# Patient Record
Sex: FEMALE | Race: WHITE | NOT HISPANIC OR LATINO | URBAN - METROPOLITAN AREA
[De-identification: names, ages, dates, MRNs, and addresses within clinical notes are randomized per-mention and may not be internally consistent; named-entity substitution may affect disease eponyms.]

---

## 2017-04-24 ENCOUNTER — OUTPATIENT (OUTPATIENT)
Dept: OUTPATIENT SERVICES | Facility: HOSPITAL | Age: 47
LOS: 1 days | Discharge: HOME | End: 2017-04-24

## 2017-06-27 DIAGNOSIS — J18.9 PNEUMONIA, UNSPECIFIED ORGANISM: ICD-10-CM

## 2017-07-25 ENCOUNTER — EMERGENCY (EMERGENCY)
Facility: HOSPITAL | Age: 47
LOS: 0 days | Discharge: HOME | End: 2017-07-25
Admitting: INTERNAL MEDICINE

## 2017-07-25 DIAGNOSIS — E03.9 HYPOTHYROIDISM, UNSPECIFIED: ICD-10-CM

## 2017-07-25 DIAGNOSIS — S99.922A UNSPECIFIED INJURY OF LEFT FOOT, INITIAL ENCOUNTER: ICD-10-CM

## 2017-07-25 DIAGNOSIS — S90.32XA CONTUSION OF LEFT FOOT, INITIAL ENCOUNTER: ICD-10-CM
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
## 2017-07-25 DIAGNOSIS — J45.909 UNSPECIFIED ASTHMA, UNCOMPLICATED: ICD-10-CM

## 2017-07-25 DIAGNOSIS — I10 ESSENTIAL (PRIMARY) HYPERTENSION: ICD-10-CM

## 2017-07-25 DIAGNOSIS — Z88.2 ALLERGY STATUS TO SULFONAMIDES: ICD-10-CM
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
## 2017-12-04 ENCOUNTER — TRANSCRIPTION ENCOUNTER (OUTPATIENT)
Age: 47
End: 2017-12-04

## 2018-01-23 ENCOUNTER — TRANSCRIPTION ENCOUNTER (OUTPATIENT)
Age: 48
End: 2018-01-23

## 2018-02-10 ENCOUNTER — TRANSCRIPTION ENCOUNTER (OUTPATIENT)
Age: 48
End: 2018-02-10

## 2019-03-06 ENCOUNTER — EMERGENCY (EMERGENCY)
Facility: HOSPITAL | Age: 49
LOS: 0 days | Discharge: HOME | End: 2019-03-07
Attending: EMERGENCY MEDICINE | Admitting: EMERGENCY MEDICINE

## 2019-03-06 VITALS — WEIGHT: 220.02 LBS

## 2019-03-06 DIAGNOSIS — T78.09XA ANAPHYLACTIC REACTION DUE TO OTHER FOOD PRODUCTS, INITIAL ENCOUNTER: ICD-10-CM
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
## 2019-03-06 DIAGNOSIS — Z88.2 ALLERGY STATUS TO SULFONAMIDES: ICD-10-CM

## 2019-03-06 DIAGNOSIS — Z91.011 ALLERGY TO MILK PRODUCTS: ICD-10-CM
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
## 2019-03-06 DIAGNOSIS — Z91.013 ALLERGY TO SEAFOOD: ICD-10-CM

## 2019-03-06 DIAGNOSIS — T78.40XA ALLERGY, UNSPECIFIED, INITIAL ENCOUNTER: ICD-10-CM

## 2019-03-06 RX ORDER — EPINEPHRINE 0.3 MG/.3ML
0.3 INJECTION INTRAMUSCULAR; SUBCUTANEOUS ONCE
Qty: 0 | Refills: 0 | Status: COMPLETED | OUTPATIENT
Start: 2019-03-06 | End: 2019-03-06

## 2019-03-06 RX ORDER — FAMOTIDINE 10 MG/ML
20 INJECTION INTRAVENOUS ONCE
Qty: 0 | Refills: 0 | Status: COMPLETED | OUTPATIENT
Start: 2019-03-06 | End: 2019-03-06

## 2019-03-06 RX ORDER — SODIUM CHLORIDE 9 MG/ML
1000 INJECTION INTRAMUSCULAR; INTRAVENOUS; SUBCUTANEOUS ONCE
Qty: 0 | Refills: 0 | Status: COMPLETED | OUTPATIENT
Start: 2019-03-06 | End: 2019-03-06

## 2019-03-06 RX ORDER — DIPHENHYDRAMINE HCL 50 MG
50 CAPSULE ORAL ONCE
Qty: 0 | Refills: 0 | Status: COMPLETED | OUTPATIENT
Start: 2019-03-06 | End: 2019-03-06

## 2019-03-06 RX ADMIN — EPINEPHRINE 0.3 MILLIGRAM(S): 0.3 INJECTION INTRAMUSCULAR; SUBCUTANEOUS at 23:57

## 2019-03-06 RX ADMIN — FAMOTIDINE 20 MILLIGRAM(S): 10 INJECTION INTRAVENOUS at 22:48

## 2019-03-06 RX ADMIN — SODIUM CHLORIDE 1000 MILLILITER(S): 9 INJECTION INTRAMUSCULAR; INTRAVENOUS; SUBCUTANEOUS at 23:58

## 2019-03-06 RX ADMIN — Medication 50 MILLIGRAM(S): at 23:57

## 2019-03-06 NOTE — ED ADULT NURSE NOTE - CHPI ED NUR SYMPTOMS NEG
no swelling of face, tongue/no rash/no vomiting/no congestion/no shortness of breath/no throat itching/no wheezing/no nausea

## 2019-03-06 NOTE — ED ADULT NURSE NOTE - OBJECTIVE STATEMENT
Pt c/o "lump in the throat" no SOB, has difficulty swallowing. Went to urgent care earlier, got benadryl, solumedrol. Pt states has multiple food allergies.

## 2019-03-06 NOTE — ED ADULT TRIAGE NOTE - CHIEF COMPLAINT QUOTE
Pt came c/o throat tightness and difficulty breathing after she ate a muffin at 1pm today. Pt went to urgent care and receive Dexamethasone and Solumedrol injection, also took Benadryl at home with no relief.

## 2019-03-06 NOTE — ED ADULT NURSE NOTE - CADM POA URETHRAL CATHETER
Received faxed request from [place] for duloxetine refill. Talked to pharmacy staff member who confirms that patient still has refills available. Patient requested refill in error. No

## 2019-03-07 VITALS — HEART RATE: 102 BPM

## 2019-03-07 RX ORDER — EPINEPHRINE 0.3 MG/.3ML
0.3 INJECTION INTRAMUSCULAR; SUBCUTANEOUS
Qty: 0.3 | Refills: 0 | OUTPATIENT
Start: 2019-03-07 | End: 2019-03-07

## 2019-03-07 RX ORDER — ALBUTEROL 90 UG/1
2 AEROSOL, METERED ORAL
Qty: 1 | Refills: 0 | OUTPATIENT
Start: 2019-03-07 | End: 2019-04-05

## 2019-03-07 RX ADMIN — SODIUM CHLORIDE 1000 MILLILITER(S): 9 INJECTION INTRAMUSCULAR; INTRAVENOUS; SUBCUTANEOUS at 01:39

## 2019-03-07 NOTE — ED PROVIDER NOTE - NS ED ROS FT
Review of Systems  Constitutional:  No fever, chills.  Eyes:  No visual changes, eye pain, or discharge.  ENMT:  No hearing changes, pain, or discharge. No nasal congestion, discharge, or bleeding. No throat pain, swelling, or difficulty swallowing. (+) throat tightness  Cardiac:  No chest pain.  Respiratory:  No orthopnea, stridor, wheezing, or cough. No hemoptysis. (+) SOB  GI:  No diarrhea, or abdominal pain. (+) nausea, vomiting  Skin:  No skin rash, pruritis, jaundice, or lesions.  Neuro:  No headache, dizziness, loss of sensation, or focal weakness.  No change in mental status.   Endocrine: No history of thyroid disease or diabetes.  Heme/Lymph: No jaundice, easy bruising, or bleeding tendency. No history of anemia

## 2019-03-07 NOTE — ED PROVIDER NOTE - PROGRESS NOTE DETAILS
Pt feels much better.  Back to baseline.  Well appearing. Pt reports improvement in symptoms, currently asymptomatic. Discussed results and provided copy to pt. Pt understands to follow-up with PMD and her allergist. Pt understands to return to ED if symptoms return/worsen. Agreeable to discharge.

## 2019-03-07 NOTE — ED PROVIDER NOTE - ATTENDING CONTRIBUTION TO CARE
50 y/o F with h/o multiple food allergies is here with a feeling of tightness in her throat that started 4 hours after eating a muffin out at a restaurant.  Pt ate the muffin at 1pm and then around 5pm started having n/v x 1 and tightness in her throat.  took 50mg of benedryl by mouth at home and no response, so came for eval after going to an [facility] and receiving 10mg decadron and 60mg solumedrol.  Pt states that usually benedryl will help.  Pt does have an epi-pen, but has never used it and was afraid to use it.  No resp distress.  Pt also c/o 2 weeks of cough.  no fever. no rhinorrhea.  PMH GERD, anaphylaxis from foods when she was a child.  EXAM: well appearing. NAD. s1s2, reg. CTAB. abd soft, nd, nt.  Normal posterior pharynx.  Speaking full sentences, but does clear her throat occasionally. P: benedryl, pepcid, epi.  observe.

## 2019-03-07 NOTE — ED PROVIDER NOTE - CLINICAL SUMMARY MEDICAL DECISION MAKING FREE TEXT BOX
Pt with apparent allergic reaction to food that she ate.  h/o food allergies.  Sxs improved and Pt back to baseline.  f/u with PCP or allergist.  Slightly tachycardic on d/c, but Pt is pacing and is very anxious to go home. Pt with apparent allergic reaction to food that she ate.  h/o food allergies.  Pt given epi for anaphylactic type sxs.  Sxs improved and Pt back to baseline.  f/u with PCP or allergist.  Slightly tachycardic on d/c, but Pt is pacing and is very anxious to go home.

## 2019-03-07 NOTE — ED PROVIDER NOTE - PHYSICAL EXAMINATION
VITAL SIGNS: I have reviewed nursing notes and confirm.  CONSTITUTIONAL: Well-developed; well-nourished; in no acute distress.  SKIN: Skin exam is warm and dry, no acute rash.  HEAD: Normocephalic; atraumatic.  EYES: Conjunctiva and sclera clear.  ENT: No nasal discharge; airway clear. Oropharynx clear, uvula midline. No tongue swelling.   NECK: Supple; non tender.  CARD: S1, S2 normal; no murmurs, gallops, or rubs. Regular rate and rhythm.  RESP: No wheezes, rales or rhonchi. Speaking in full sentences.   EXT: Normal ROM. No clubbing, cyanosis or edema.  NEURO: Alert, oriented. Grossly unremarkable. No focal deficits.

## 2019-03-07 NOTE — ED PROVIDER NOTE - OBJECTIVE STATEMENT
48 yo F with PMHx of multiple food allergies presents to the ED c/o throat tightness and SOB. Pt ate muffin earlier today and about 4 hours after symptoms developed. Pt initially felt nausea and had one episode of non-bilious/non-bloody vomiting and then developed throat tightness and trouble breathing. Pt took 50 mg of benadryl and went to [facility] where she received 10 mg of decadron and 60 mg of solumedrol. She was sent to ED for further eval. Here pt still complains of throat tightness and feeling mild SOB. Pt also complains of non-productive cough x 2 weeks. Denies other complaints. Pt denies fever, chills, abdominal pain, diarrhea, headache, dizziness, weakness, chest pain, back pain, LOC, trauma, calf pain/swelling, recent travel, recent surgery.

## 2019-03-07 NOTE — ED PROVIDER NOTE - NSFOLLOWUPINSTRUCTIONS_ED_ALL_ED_FT
Anaphylaxis    An anaphylactic reaction (anaphylaxis) is a sudden, severe allergic reaction that affects multiple areas of the body. An allergic reaction is an abnormal reaction to a substance (allergen) by the body's defense system. Common allergens include medicines, food, insect bites or stings, and blood products. The body releases certain proteins into the blood that can cause a variety of symptoms such as an itchy rash, wheezing, swelling of the face/lips/tongue/throat, abdominal pain, nausea or vomiting. An allergic reaction is usually treated with medication. If your health care provider prescribed you an epinephrine injection device, make sure to keep it with you at all times.    SEEK IMMEDIATE MEDICAL CARE IF YOU HAVE ANY OF THE FOLLOWING SYMPTOMS: allergic reaction severe enough that required you to use epinephrine, tightness in your chest, swelling around your lips/tongue/throat, abdominal pain, vomiting or diarrhea, or lightheadedness/dizziness. These symptoms may represent a serious problem that is an emergency. Do not wait to see if the symptoms will go away. Use your auto-injector pen or anaphylaxis kit as you have been instructed. Call 911 and do not drive yourself to the hospital.

## 2019-03-07 NOTE — ED PROVIDER NOTE - CARE PROVIDER_API CALL
Sharonda Ellsworth)  Allergy and Immunology; Internal Medicine  [address] [place] [address]  Phone: [phone]  Fax: [phone]  Follow Up Time: 1-3 Days

## 2019-08-18 ENCOUNTER — EMERGENCY (EMERGENCY)
Facility: HOSPITAL | Age: 49
LOS: 0 days | Discharge: HOME | End: 2019-08-19
Admitting: EMERGENCY MEDICINE
Payer: MEDICAID

## 2019-08-18 VITALS
HEART RATE: 79 BPM | DIASTOLIC BLOOD PRESSURE: 75 MMHG | RESPIRATION RATE: 20 BRPM | SYSTOLIC BLOOD PRESSURE: 157 MMHG | OXYGEN SATURATION: 98 %

## 2019-08-18 DIAGNOSIS — L60.8 OTHER NAIL DISORDERS: ICD-10-CM

## 2019-08-18 DIAGNOSIS — Z88.2 ALLERGY STATUS TO SULFONAMIDES: ICD-10-CM

## 2019-08-18 DIAGNOSIS — B35.1 TINEA UNGUIUM: ICD-10-CM

## 2019-08-18 DIAGNOSIS — Z91.011 ALLERGY TO MILK PRODUCTS: ICD-10-CM

## 2019-08-18 DIAGNOSIS — Z91.013 ALLERGY TO SEAFOOD: ICD-10-CM

## 2019-08-18 PROCEDURE — 99282 EMERGENCY DEPT VISIT SF MDM: CPT

## 2019-08-19 NOTE — ED PROVIDER NOTE - CARE PROVIDER_API CALL
Chandrakant Pandey)  Dermatology; Internal Medicine  [address] [place] [address]  Phone: [phone]  Fax: [phone]  Follow Up Time:     Ziggy Salmeron)  Dermatology; Internal Medicine  [address] [place] [address]  Phone: [phone]  Fax: [phone]  Follow Up Time:

## 2019-08-19 NOTE — ED PROVIDER NOTE - PMH
Colitis    GERD (gastroesophageal reflux disease)    Hypothyroid    No pertinent past medical history

## 2019-08-19 NOTE — ED PROVIDER NOTE - CLINICAL SUMMARY MEDICAL DECISION MAKING FREE TEXT BOX
Patient came to the ED c/o nail shape abnormality.  Patient states she noticed the nail appears thicker then rest of nails after having powered artifical nails placed and is unsure if fungal infection beneath nail. Nail appears to be slightly thicker then rest, but no TTP. No erythema. No abscess or purulent drainage. No signs of infection.  No fever.  No signs of fungal infection. Patient advised to soak artifical nail off and let breathe.  will give derm follow up.  Return precautions given. Patient verbalizes understanding of plan.  Agreeable to discharge. Patient came to the ED c/o nail shape abnormality.  Patient states she noticed the nail appears thicker then rest of nails after having powered artifical nails placed and is unsure if fungal infection beneath nail. Nail appears to be slightly thicker then rest, but no TTP. No erythema. No abscess or purulent drainage. No signs of infection.  No fever.  Possible onychomycosis. Patient advised to soak artifical nail off and let breathe.  will give derm follow up.  Return precautions given. Patient verbalizes understanding of plan.  Agreeable to discharge.

## 2019-08-19 NOTE — ED PROVIDER NOTE - OBJECTIVE STATEMENT
50 yo female 48 yo female with PMH of GERD, hypothyroidism, colitis presents to the ED c/o nail shape abnormality and requesting evaluation for second opinion.  Patient states she noticed the nail appears thicker then rest of nails after having powered artifical nails placed and is unsure if fungal infection beneath nail. Nail appears to be slightly thicker then rest. Patient noticed this three weeks ago and had nails done again today.  Denies nail/finger pain, fever, chills, rash, pruritus, purulent drainage, numbness/tingling/weakness to extremities B/L, or traumatic event/injury.

## 2019-08-19 NOTE — ED PROVIDER NOTE - CARE PLAN
Principal Discharge DX:	Abnormality of shape of nail Principal Discharge DX:	Onychomycosis  Secondary Diagnosis:	Abnormality of shape of nail

## 2019-08-19 NOTE — ED PROVIDER NOTE - PHYSICAL EXAMINATION
GENERAL: Well-nourished, Well-developed. NAD.  CVS: Normal S1,S2. No murmurs appreciated on auscultation   RESP: No use of accessory muscles. Chest rise symmetrical with good expansion. Lungs clear to auscultation B/L. No wheezing, rales, or rhonchi auscultated.  MSK: No visible signs of trauma such as ecchymosis, erythema, or swelling. FROM of upper and lower extremities B/L.   Skin: + slight nail thickening to right 2nd digit. No signs of infection. No erythema or swelling. No TTP. No ecchymosis. No purulent drainage or fluctuant masses. No paronychia. + nail polish still in place.    EXT: Radial pulses present B/L.   Neuro: AA&O x 3. CNs II-XII grossly intact. Speaking in full sentences. No slurring of speech. No facial droop. No tremors. Sensation grossly intact. Strength 5/5 B/L. Gait within normal limits.   Psych:  Cooperative. Calm

## 2019-08-19 NOTE — ED PROVIDER NOTE - NS ED ROS FT
Constitutional: (-) fever (-) chills   Cardiac: (-) chest pain  Respiratory:  (-) SOB  MS: (-) finger pain   Neuro: (-) numbness/tingling to extremities B/L   Skin: (+) nail thickening (-) rash  Except as documented in the HPI, all other systems are negative.

## 2019-09-01 ENCOUNTER — OUTPATIENT (OUTPATIENT)
Dept: OUTPATIENT SERVICES | Facility: HOSPITAL | Age: 49
LOS: 1 days | End: 2019-09-01
Payer: MEDICAID

## 2019-09-01 PROCEDURE — G9001: CPT

## 2019-09-13 DIAGNOSIS — Z71.89 OTHER SPECIFIED COUNSELING: ICD-10-CM

## 2019-09-13 PROBLEM — E03.9 HYPOTHYROIDISM, UNSPECIFIED: Chronic | Status: ACTIVE | Noted: 2019-08-27

## 2019-09-13 PROBLEM — K21.9 GASTRO-ESOPHAGEAL REFLUX DISEASE WITHOUT ESOPHAGITIS: Chronic | Status: ACTIVE | Noted: 2019-08-27

## 2019-09-13 PROBLEM — K52.9 NONINFECTIVE GASTROENTERITIS AND COLITIS, UNSPECIFIED: Chronic | Status: ACTIVE | Noted: 2019-08-27

## 2019-12-23 NOTE — ED ADULT NURSE NOTE - GASTROINTESTINAL WDL
Occupational Therapy    Occupational Therapy Not Seen Note    DATE: 2019  Name: Henok Peres  : 1953  MRN: [id number]    Patient not available for Occupational Therapy due to:    Pt independent with functional mobility and functional tasks. Pt with no OT acute care needs at this time, will defer OT eval. Pt at baseline.     Next Scheduled Treatment: N/A    Electronically signed by Ariadne Jaffe OT on 2019 at 2:18 PM Abdomen soft, nontender, nondistended, bowel sounds present in all 4 quadrants.

## 2020-06-02 ENCOUNTER — OUTPATIENT (OUTPATIENT)
Dept: OUTPATIENT SERVICES | Facility: HOSPITAL | Age: 50
LOS: 1 days | Discharge: HOME | End: 2020-06-02

## 2020-06-04 ENCOUNTER — OUTPATIENT (OUTPATIENT)
Dept: OUTPATIENT SERVICES | Facility: HOSPITAL | Age: 50
LOS: 1 days | Discharge: HOME | End: 2020-06-04

## 2020-10-02 ENCOUNTER — TRANSCRIPTION ENCOUNTER (OUTPATIENT)
Age: 50
End: 2020-10-02

## 2020-11-29 ENCOUNTER — EMERGENCY (EMERGENCY)
Facility: HOSPITAL | Age: 50
LOS: 1 days | Discharge: HOME | End: 2020-11-29
Admitting: EMERGENCY MEDICINE
Payer: MEDICAID

## 2020-11-29 VITALS
SYSTOLIC BLOOD PRESSURE: 139 MMHG | OXYGEN SATURATION: 97 % | HEART RATE: 90 BPM | DIASTOLIC BLOOD PRESSURE: 65 MMHG | RESPIRATION RATE: 18 BRPM

## 2020-11-29 VITALS
HEART RATE: 94 BPM | WEIGHT: 220.02 LBS | RESPIRATION RATE: 16 BRPM | DIASTOLIC BLOOD PRESSURE: 81 MMHG | OXYGEN SATURATION: 95 % | SYSTOLIC BLOOD PRESSURE: 151 MMHG | TEMPERATURE: 98 F | HEIGHT: 61 IN

## 2020-11-29 DIAGNOSIS — R42 DIZZINESS AND GIDDINESS: ICD-10-CM

## 2020-11-29 DIAGNOSIS — Z91.011 ALLERGY TO MILK PRODUCTS: ICD-10-CM

## 2020-11-29 DIAGNOSIS — Z88.2 ALLERGY STATUS TO SULFONAMIDES: ICD-10-CM

## 2020-11-29 LAB
ALBUMIN SERPL ELPH-MCNC: 3.8 G/DL — SIGNIFICANT CHANGE UP (ref 3.4–5)
ALP SERPL-CCNC: 75 U/L — SIGNIFICANT CHANGE UP (ref 40–120)
ALT FLD-CCNC: 22 U/L — SIGNIFICANT CHANGE UP (ref 12–42)
ANION GAP SERPL CALC-SCNC: 7 MMOL/L — LOW (ref 9–16)
AST SERPL-CCNC: 19 U/L — SIGNIFICANT CHANGE UP (ref 15–37)
BASOPHILS # BLD AUTO: 0 K/UL — SIGNIFICANT CHANGE UP (ref 0–0.2)
BASOPHILS NFR BLD AUTO: 0 % — SIGNIFICANT CHANGE UP (ref 0–2)
BILIRUB SERPL-MCNC: 0.3 MG/DL — SIGNIFICANT CHANGE UP (ref 0.2–1.2)
BUN SERPL-MCNC: 19 MG/DL — SIGNIFICANT CHANGE UP (ref 7–23)
CALCIUM SERPL-MCNC: 9.7 MG/DL — SIGNIFICANT CHANGE UP (ref 8.5–10.5)
CHLORIDE SERPL-SCNC: 104 MMOL/L — SIGNIFICANT CHANGE UP (ref 96–108)
CO2 SERPL-SCNC: 31 MMOL/L — SIGNIFICANT CHANGE UP (ref 22–31)
CREAT SERPL-MCNC: 0.94 MG/DL — SIGNIFICANT CHANGE UP (ref 0.5–1.3)
EOSINOPHIL # BLD AUTO: 0.4 K/UL — SIGNIFICANT CHANGE UP (ref 0–0.5)
EOSINOPHIL NFR BLD AUTO: 3 % — SIGNIFICANT CHANGE UP (ref 0–6)
GLUCOSE SERPL-MCNC: 106 MG/DL — HIGH (ref 70–99)
HCT VFR BLD CALC: 41.3 % — SIGNIFICANT CHANGE UP (ref 34.5–45)
HGB BLD-MCNC: 12.9 G/DL — SIGNIFICANT CHANGE UP (ref 11.5–15.5)
LYMPHOCYTES # BLD AUTO: 19 % — SIGNIFICANT CHANGE UP (ref 13–44)
LYMPHOCYTES # BLD AUTO: 2.51 K/UL — SIGNIFICANT CHANGE UP (ref 1–3.3)
MAGNESIUM SERPL-MCNC: 2 MG/DL — SIGNIFICANT CHANGE UP (ref 1.6–2.6)
MANUAL SMEAR VERIFICATION: SIGNIFICANT CHANGE UP
MCHC RBC-ENTMCNC: 25.9 PG — LOW (ref 27–34)
MCHC RBC-ENTMCNC: 31.2 GM/DL — LOW (ref 32–36)
MCV RBC AUTO: 82.8 FL — SIGNIFICANT CHANGE UP (ref 80–100)
MONOCYTES # BLD AUTO: 0.53 K/UL — SIGNIFICANT CHANGE UP (ref 0–0.9)
MONOCYTES NFR BLD AUTO: 4 % — SIGNIFICANT CHANGE UP (ref 2–14)
NEUTROPHILS # BLD AUTO: 8.71 K/UL — HIGH (ref 1.8–7.4)
NEUTROPHILS NFR BLD AUTO: 66 % — SIGNIFICANT CHANGE UP (ref 43–77)
NRBC # BLD: 0 /100 — SIGNIFICANT CHANGE UP (ref 0–0)
NRBC # BLD: SIGNIFICANT CHANGE UP /100 WBCS (ref 0–0)
PLAT MORPH BLD: NORMAL — SIGNIFICANT CHANGE UP
PLATELET # BLD AUTO: 268 K/UL — SIGNIFICANT CHANGE UP (ref 150–400)
POTASSIUM SERPL-MCNC: 3.7 MMOL/L — SIGNIFICANT CHANGE UP (ref 3.5–5.3)
POTASSIUM SERPL-SCNC: 3.7 MMOL/L — SIGNIFICANT CHANGE UP (ref 3.5–5.3)
PROT SERPL-MCNC: 8.1 G/DL — SIGNIFICANT CHANGE UP (ref 6.4–8.2)
RBC # BLD: 4.99 M/UL — SIGNIFICANT CHANGE UP (ref 3.8–5.2)
RBC # FLD: 13.4 % — SIGNIFICANT CHANGE UP (ref 10.3–14.5)
RBC BLD AUTO: NORMAL — SIGNIFICANT CHANGE UP
SODIUM SERPL-SCNC: 142 MMOL/L — SIGNIFICANT CHANGE UP (ref 132–145)
TROPONIN I SERPL-MCNC: <0.017 NG/ML — LOW (ref 0.02–0.06)
VARIANT LYMPHS # BLD: 8 % — HIGH (ref 0–6)
WBC # BLD: 13.2 K/UL — HIGH (ref 3.8–10.5)
WBC # FLD AUTO: 13.2 K/UL — HIGH (ref 3.8–10.5)

## 2020-11-29 PROCEDURE — 93010 ELECTROCARDIOGRAM REPORT: CPT

## 2020-11-29 PROCEDURE — 99285 EMERGENCY DEPT VISIT HI MDM: CPT

## 2020-11-29 NOTE — ED PROVIDER NOTE - CLINICAL SUMMARY MEDICAL DECISION MAKING FREE TEXT BOX
well appearing, NAD, episode of lightheadedness earlier probable due to decreased intake today, EKG NSR, well appearing, tolerating Po, ate snacks in ED, labs reviewed, advised f/u PMD. return precautions discussed. mild elevated wbc with atypical lymphocytes, no infectious symptoms, advised f/u pmd

## 2020-11-29 NOTE — ED ADULT NURSE NOTE - OBJECTIVE STATEMENT
pt. reports episode for near syncope and dizziness prior to arrival. Pt. denies any chest pain on arrival, in no acute distress.

## 2020-11-29 NOTE — ED PROVIDER NOTE - PHYSICAL EXAMINATION
CONSTITUTIONAL: Well-appearing; well-nourished; in no apparent distress.   	HEAD: Normocephalic; atraumatic.   	EYES:  conjunctiva and sclera clear  	ENT: normal nose; no rhinorrhea; normal pharynx with no erythema or lesions.   	NECK: Supple; non-tender;   	CARDIOVASCULAR: Normal S1, S2; no murmurs, rubs, or gallops. Regular rate and rhythm.   	RESPIRATORY: Breathing easily; breath sounds clear and equal bilaterally; no wheezes, rhonchi, or rales.  	GI: Soft; non-distended; non-tender; no palpable organomegaly.   	EXT: No cyanosis or edema; N/V intact  	SKIN: Normal for age and race; warm; dry; good turgor; no apparent lesions or rash.   	NEURO: A & O x 3; face symmetric; grossly unremarkable.   PSYCHOLOGICAL: The patient’s mood and manner are appropriate.

## 2020-11-29 NOTE — ED PROVIDER NOTE - PATIENT PORTAL LINK FT
You can access the FollowMyHealth Patient Portal offered by [facility] by registering at the following website: [email or web address]. By joining [employer]’s FollowMyHealth portal, you will also be able to view your health information using other applications (apps) compatible with our system.

## 2020-11-29 NOTE — ED PROVIDER NOTE - OBJECTIVE STATEMENT
51 yo male h/o GERD, hypothyroidism, and HTN. BIBA c/o having an episode of lightheadedness that lasted approximately a few minutes earlier today that occurred while sitting in a car. states she was at a  earlier today and had not eaten much all day. 49 yo male h/o GERD, hypothyroidism, and HTN. BIBA c/o having an episode of lightheadedness that lasted approximately a few minutes earlier today that occurred while sitting in a car. states she was at a  earlier today and had not eaten much all day. now back to baseline. no chest pain, dyspnea, vomiting or diarrhea. no URI or infectious symptoms.

## 2020-11-29 NOTE — ED ADULT TRIAGE NOTE - CHIEF COMPLAINT QUOTE
BIBA c/o near syncopal episode lasting approximately a few minutes. pt reports mild dizziness at this time. h/o GERD, hypothyroidism, and HTN. as per EMS, on EKG in the field, pt had PVCs. denies CP.

## 2020-11-29 NOTE — ED ADULT NURSE NOTE - NSIMPLEMENTINTERV_GEN_ALL_ED
Implemented All Universal Safety Interventions:  [place] to call system. Call bell, personal items and telephone within reach. Instruct patient to call for assistance. Room bathroom lighting operational. Non-slip footwear when patient is off stretcher. Physically safe environment: no spills, clutter or unnecessary equipment. Stretcher in lowest position, wheels locked, appropriate side rails in place.

## 2020-11-29 NOTE — ED PROVIDER NOTE - NSFOLLOWUPINSTRUCTIONS_ED_ALL_ED_FT
Follow up with your doctor    Return to the Emergency Department for any concerning or worsening symptoms including chest pain, shortness of breath, vomiting or any concerns.

## 2021-01-11 ENCOUNTER — TRANSCRIPTION ENCOUNTER (OUTPATIENT)
Age: 51
End: 2021-01-11

## 2021-02-25 ENCOUNTER — OUTPATIENT (OUTPATIENT)
Dept: OUTPATIENT SERVICES | Facility: HOSPITAL | Age: 51
LOS: 1 days | Discharge: HOME | End: 2021-02-25

## 2021-09-13 ENCOUNTER — TRANSCRIPTION ENCOUNTER (OUTPATIENT)
Age: 51
End: 2021-09-13

## 2021-12-28 ENCOUNTER — EMERGENCY (EMERGENCY)
Facility: HOSPITAL | Age: 51
LOS: 0 days | Discharge: HOME | End: 2021-12-29
Attending: STUDENT IN AN ORGANIZED HEALTH CARE EDUCATION/TRAINING PROGRAM | Admitting: STUDENT IN AN ORGANIZED HEALTH CARE EDUCATION/TRAINING PROGRAM
Payer: MEDICAID

## 2021-12-28 VITALS
DIASTOLIC BLOOD PRESSURE: 77 MMHG | TEMPERATURE: 100 F | HEART RATE: 104 BPM | OXYGEN SATURATION: 96 % | WEIGHT: 218.04 LBS | SYSTOLIC BLOOD PRESSURE: 142 MMHG | RESPIRATION RATE: 18 BRPM

## 2021-12-28 DIAGNOSIS — Z91.011 ALLERGY TO MILK PRODUCTS: ICD-10-CM

## 2021-12-28 DIAGNOSIS — Z91.013 ALLERGY TO SEAFOOD: ICD-10-CM

## 2021-12-28 DIAGNOSIS — K02.9 DENTAL CARIES, UNSPECIFIED: ICD-10-CM

## 2021-12-28 DIAGNOSIS — K08.89 OTHER SPECIFIED DISORDERS OF TEETH AND SUPPORTING STRUCTURES: ICD-10-CM

## 2021-12-28 DIAGNOSIS — Z88.2 ALLERGY STATUS TO SULFONAMIDES: ICD-10-CM

## 2021-12-28 DIAGNOSIS — R22.0 LOCALIZED SWELLING, MASS AND LUMP, HEAD: ICD-10-CM

## 2021-12-28 DIAGNOSIS — E03.9 HYPOTHYROIDISM, UNSPECIFIED: ICD-10-CM

## 2021-12-28 DIAGNOSIS — K21.9 GASTRO-ESOPHAGEAL REFLUX DISEASE WITHOUT ESOPHAGITIS: ICD-10-CM

## 2021-12-28 DIAGNOSIS — K04.7 PERIAPICAL ABSCESS WITHOUT SINUS: ICD-10-CM

## 2021-12-28 LAB
ALBUMIN SERPL ELPH-MCNC: 4.4 G/DL — SIGNIFICANT CHANGE UP (ref 3.5–5.2)
ALP SERPL-CCNC: 68 U/L — SIGNIFICANT CHANGE UP (ref 30–115)
ALT FLD-CCNC: 26 U/L — SIGNIFICANT CHANGE UP (ref 0–41)
ANION GAP SERPL CALC-SCNC: 15 MMOL/L — HIGH (ref 7–14)
AST SERPL-CCNC: 25 U/L — SIGNIFICANT CHANGE UP (ref 0–41)
BASOPHILS # BLD AUTO: 0.04 K/UL — SIGNIFICANT CHANGE UP (ref 0–0.2)
BASOPHILS NFR BLD AUTO: 0.4 % — SIGNIFICANT CHANGE UP (ref 0–1)
BILIRUB SERPL-MCNC: 0.4 MG/DL — SIGNIFICANT CHANGE UP (ref 0.2–1.2)
BUN SERPL-MCNC: 20 MG/DL — SIGNIFICANT CHANGE UP (ref 10–20)
CALCIUM SERPL-MCNC: 10.2 MG/DL — HIGH (ref 8.5–10.1)
CHLORIDE SERPL-SCNC: 97 MMOL/L — LOW (ref 98–110)
CO2 SERPL-SCNC: 26 MMOL/L — SIGNIFICANT CHANGE UP (ref 17–32)
CREAT SERPL-MCNC: 1.1 MG/DL — SIGNIFICANT CHANGE UP (ref 0.7–1.5)
EOSINOPHIL # BLD AUTO: 0.32 K/UL — SIGNIFICANT CHANGE UP (ref 0–0.7)
EOSINOPHIL NFR BLD AUTO: 3 % — SIGNIFICANT CHANGE UP (ref 0–8)
GLUCOSE SERPL-MCNC: 104 MG/DL — HIGH (ref 70–99)
HCG SERPL QL: NEGATIVE — SIGNIFICANT CHANGE UP
HCT VFR BLD CALC: 45.3 % — SIGNIFICANT CHANGE UP (ref 37–47)
HGB BLD-MCNC: 13.8 G/DL — SIGNIFICANT CHANGE UP (ref 12–16)
IMM GRANULOCYTES NFR BLD AUTO: 0.2 % — SIGNIFICANT CHANGE UP (ref 0.1–0.3)
LACTATE SERPL-SCNC: 1.7 MMOL/L — SIGNIFICANT CHANGE UP (ref 0.7–2)
LYMPHOCYTES # BLD AUTO: 37.9 % — SIGNIFICANT CHANGE UP (ref 20.5–51.1)
LYMPHOCYTES # BLD AUTO: 4 K/UL — HIGH (ref 1.2–3.4)
MAGNESIUM SERPL-MCNC: 2 MG/DL — SIGNIFICANT CHANGE UP (ref 1.8–2.4)
MCHC RBC-ENTMCNC: 25.2 PG — LOW (ref 27–31)
MCHC RBC-ENTMCNC: 30.5 G/DL — LOW (ref 32–37)
MCV RBC AUTO: 82.7 FL — SIGNIFICANT CHANGE UP (ref 81–99)
MONOCYTES # BLD AUTO: 0.87 K/UL — HIGH (ref 0.1–0.6)
MONOCYTES NFR BLD AUTO: 8.2 % — SIGNIFICANT CHANGE UP (ref 1.7–9.3)
NEUTROPHILS # BLD AUTO: 5.31 K/UL — SIGNIFICANT CHANGE UP (ref 1.4–6.5)
NEUTROPHILS NFR BLD AUTO: 50.3 % — SIGNIFICANT CHANGE UP (ref 42.2–75.2)
NRBC # BLD: 0 /100 WBCS — SIGNIFICANT CHANGE UP (ref 0–0)
PLATELET # BLD AUTO: 311 K/UL — SIGNIFICANT CHANGE UP (ref 130–400)
POTASSIUM SERPL-MCNC: 4 MMOL/L — SIGNIFICANT CHANGE UP (ref 3.5–5)
POTASSIUM SERPL-SCNC: 4 MMOL/L — SIGNIFICANT CHANGE UP (ref 3.5–5)
PROT SERPL-MCNC: 7.3 G/DL — SIGNIFICANT CHANGE UP (ref 6–8)
RBC # BLD: 5.48 M/UL — HIGH (ref 4.2–5.4)
RBC # FLD: 14.2 % — SIGNIFICANT CHANGE UP (ref 11.5–14.5)
SODIUM SERPL-SCNC: 138 MMOL/L — SIGNIFICANT CHANGE UP (ref 135–146)
WBC # BLD: 10.56 K/UL — SIGNIFICANT CHANGE UP (ref 4.8–10.8)
WBC # FLD AUTO: 10.56 K/UL — SIGNIFICANT CHANGE UP (ref 4.8–10.8)

## 2021-12-28 PROCEDURE — 99285 EMERGENCY DEPT VISIT HI MDM: CPT

## 2021-12-28 RX ORDER — AMPICILLIN SODIUM AND SULBACTAM SODIUM 250; 125 MG/ML; MG/ML
3 INJECTION, POWDER, FOR SUSPENSION INTRAMUSCULAR; INTRAVENOUS ONCE
Refills: 0 | Status: COMPLETED | OUTPATIENT
Start: 2021-12-28 | End: 2021-12-28

## 2021-12-28 RX ADMIN — AMPICILLIN SODIUM AND SULBACTAM SODIUM 200 GRAM(S): 250; 125 INJECTION, POWDER, FOR SUSPENSION INTRAMUSCULAR; INTRAVENOUS at 23:16

## 2021-12-28 NOTE — ED ADULT NURSE NOTE - OBJECTIVE STATEMENT
patient complaints of dental pain, denies pain at this time. Patient reports she was sent in by urgy for soft tissue scan due to bilateral jaw swelling.

## 2021-12-29 VITALS
TEMPERATURE: 98 F | RESPIRATION RATE: 18 BRPM | HEART RATE: 80 BPM | DIASTOLIC BLOOD PRESSURE: 72 MMHG | OXYGEN SATURATION: 98 % | SYSTOLIC BLOOD PRESSURE: 127 MMHG

## 2021-12-29 PROCEDURE — 70491 CT SOFT TISSUE NECK W/DYE: CPT | Mod: 26,MA

## 2021-12-29 RX ORDER — AMOXICILLIN 250 MG/5ML
1 SUSPENSION, RECONSTITUTED, ORAL (ML) ORAL
Qty: 30 | Refills: 0
Start: 2021-12-29 | End: 2022-01-07

## 2021-12-29 NOTE — ED PROVIDER NOTE - NS ED ROS FT
Review of Systems  Constitutional:  No fever, chills.  Eyes:  No visual changes, eye pain, or discharge.  ENMT:  No hearing changes, pain, or discharge. No nasal congestion, discharge, or bleeding. No throat pain, swelling, or difficulty swallowing. (+) R lower dental pain  Cardiac:  No chest pain, palpitations, syncope, or edema.  Respiratory:  No dyspnea, cough. No hemoptysis.  GI:  No nausea, vomiting, diarrhea, or abdominal pain.  :  No dysuria, hematuria, frequency, or burning.   MS:  No back pain.  Skin:  No skin rash, pruritis, jaundice, or lesions.  Neuro:  No headache, dizziness, loss of sensation, or focal weakness.  No change in mental status.

## 2021-12-29 NOTE — ED PROVIDER NOTE - ATTENDING CONTRIBUTION TO CARE
52-year-old female no past medical history presents today with dental pain and swelling of the right cheek for 2 weeks no fever no chills no drainage no urinary symptoms no chest pain.  Well appearing, NAD, non toxic. NCAT PERRLA EOMI increased swelling in the right cheek without drainage.  Supple non tender normal wob cta bl rrr abdomen s nt nd no rebound no guarding WWPx4 neuro non focal CT antibiotics labs

## 2021-12-29 NOTE — ED PROVIDER NOTE - PATIENT PORTAL LINK FT
You can access the FollowMyHealth Patient Portal offered by [facility] by registering at the following website: [email or web address]. By joining [employer]’s FollowMyHealth portal, you will also be able to view your health information using other applications (apps) compatible with our system.

## 2021-12-29 NOTE — ED PROVIDER NOTE - OBJECTIVE STATEMENT
52 yo F with PMHx of GERD, hypothyroidism, colitis, and multiple food allergies presents to the ED c/o right sided dental pain with associated R cheek and submandibular swelling. Pt went to [facility] and was advised to go to ED for CT to r/o abscess. Pt states she has a lot of dental issues and needs to have comprehensive dental work. She first noticed pain to R lower teeth about two weeks ago and symptoms have been worsening. She has not taken any medication to improve symptoms. She denies other complaints. No fever, chills, headache, dizziness, ear pain, sore throat, dysphagia.

## 2021-12-29 NOTE — ED PROVIDER NOTE - NSFOLLOWUPCLINICS_GEN_ALL_ED_FT
[facility]  Dental  [address] [place] [id number]  Phone: [phone]  Fax:   Follow Up Time: 1-3 Days

## 2021-12-29 NOTE — ED PROVIDER NOTE - CLINICAL SUMMARY MEDICAL DECISION MAKING FREE TEXT BOX
No submandibular or floor or mouth swelling, voice change, odynophagia or drooling, fever, chills, or inhaled tooth fragment.   Exam shows caries, no inflamed alveolus, no discharge of purulence or abscess.  A/P: Likely caries and dental infection. Will refer to dental. No sign of deep infection, airway compromise, or spread into surrounding structures.

## 2021-12-29 NOTE — ED PROVIDER NOTE - PHYSICAL EXAMINATION
VITAL SIGNS: I have reviewed nursing notes and confirm.  CONSTITUTIONAL: Well-developed; well-nourished; in no acute distress.  SKIN: Skin exam is warm and dry, no acute rash.  HEAD: Normocephalic; atraumatic.  EYES: Conjunctiva and sclera clear.  ENT: No nasal discharge; airway clear. Oropharynx clear. Uvula midline. No drooling or stridor. (+) R lower molar TTP, no abscess noted. Mild R submandibular and cheek swelling. No fluctuance, erythema or warmth.   NECK: Supple; non tender.  CARD: S1, S2 normal; no murmurs, gallops, or rubs. Regular rate and rhythm.  RESP: No wheezes, rales or rhonchi. Speaking in full sentences.   EXT: Normal ROM.   NEURO: Alert, oriented. Grossly unremarkable. No focal deficits.

## 2023-01-31 PROBLEM — Z00.00 ENCOUNTER FOR PREVENTIVE HEALTH EXAMINATION: Status: ACTIVE | Noted: 2023-01-31

## 2023-02-17 ENCOUNTER — LABORATORY RESULT (OUTPATIENT)
Age: 53
End: 2023-02-17

## 2023-02-17 ENCOUNTER — NON-APPOINTMENT (OUTPATIENT)
Age: 53
End: 2023-02-17

## 2023-02-17 ENCOUNTER — APPOINTMENT (OUTPATIENT)
Dept: NEUROLOGY | Facility: CLINIC | Age: 53
End: 2023-02-17
Payer: MEDICAID

## 2023-02-17 VITALS
HEIGHT: 61 IN | DIASTOLIC BLOOD PRESSURE: 80 MMHG | OXYGEN SATURATION: 95 % | BODY MASS INDEX: 41.54 KG/M2 | TEMPERATURE: 97.8 F | SYSTOLIC BLOOD PRESSURE: 179 MMHG | HEART RATE: 53 BPM | WEIGHT: 220 LBS

## 2023-02-17 DIAGNOSIS — M25.50 PAIN IN UNSPECIFIED JOINT: ICD-10-CM

## 2023-02-17 DIAGNOSIS — K21.9 GASTRO-ESOPHAGEAL REFLUX DISEASE W/OUT ESOPHAGITIS: ICD-10-CM

## 2023-02-17 DIAGNOSIS — R26.9 UNSPECIFIED ABNORMALITIES OF GAIT AND MOBILITY: ICD-10-CM

## 2023-02-17 DIAGNOSIS — I10 ESSENTIAL (PRIMARY) HYPERTENSION: ICD-10-CM

## 2023-02-17 DIAGNOSIS — E03.9 HYPOTHYROIDISM, UNSPECIFIED: ICD-10-CM

## 2023-02-17 DIAGNOSIS — K58.9 IRRITABLE BOWEL SYNDROME W/OUT DIARRHEA: ICD-10-CM

## 2023-02-17 PROCEDURE — 99204 OFFICE O/P NEW MOD 45 MIN: CPT

## 2023-02-17 RX ORDER — OMEPRAZOLE 40 MG/1
40 CAPSULE, DELAYED RELEASE ORAL DAILY
Refills: 0 | Status: ACTIVE | COMMUNITY

## 2023-02-17 RX ORDER — VALSARTAN AND HYDROCHLOROTHIAZIDE 320; 25 MG/1; MG/1
320-25 TABLET, FILM COATED ORAL DAILY
Refills: 0 | Status: ACTIVE | COMMUNITY

## 2023-02-17 RX ORDER — GABAPENTIN 300 MG/1
300 CAPSULE ORAL TWICE DAILY
Qty: 60 | Refills: 1 | Status: DISCONTINUED | COMMUNITY
Start: 2023-02-17 | End: 2023-02-17

## 2023-02-17 RX ORDER — CLONIDINE HYDROCHLORIDE 0.1 MG/1
0.1 TABLET ORAL DAILY
Refills: 0 | Status: ACTIVE | COMMUNITY

## 2023-02-17 RX ORDER — LEVOTHYROXINE SODIUM 0.1 MG/1
100 TABLET ORAL DAILY
Refills: 0 | Status: ACTIVE | COMMUNITY

## 2023-02-17 RX ORDER — FUROSEMIDE 20 MG/1
20 TABLET ORAL DAILY
Refills: 0 | Status: ACTIVE | COMMUNITY

## 2023-02-17 NOTE — DISCUSSION/SUMMARY
[FreeTextEntry1] : Pt is a 52 yo F with hypothyroidism, peripheral edema, IBS, GERD, HTN, who presents with s/o diffuse pain and mobility issues. \par \par # Diffused arthralgia/joint pain and limited mobility: decreased strength in RUE and BLE, likely 2/2 pain, r/o underlying radiculopathy. Suspect arthritis and fibromyalgia as etiology of symptoms.\par - MRI cervical and lumbar spine\par - Inflammatory w/u\par - Discussed starting GBP however pt states that she has difficulty swallowing pills\par - Rheumatology referral\par \par \par RTC in 4 mo

## 2023-02-17 NOTE — PHYSICAL EXAM
[General Appearance - Alert] : alert [General Appearance - In No Acute Distress] : in no acute distress [General Appearance - Well Nourished] : well nourished [General Appearance - Well Developed] : well developed [Oriented To Time, Place, And Person] : oriented to person, place, and time [Affect] : the affect was normal [Person] : oriented to person [Place] : oriented to place [Time] : oriented to time [Fluency] : fluency intact [Comprehension] : comprehension intact [Cranial Nerves Optic (II)] : visual acuity intact bilaterally,  visual fields full to confrontation, pupils equal round and reactive to light [Cranial Nerves Oculomotor (III)] : extraocular motion intact [Cranial Nerves Trigeminal (V)] : facial sensation intact symmetrically [Cranial Nerves Facial (VII)] : face symmetrical [Cranial Nerves Vestibulocochlear (VIII)] : hearing was intact bilaterally [Cranial Nerves Glossopharyngeal (IX)] : tongue and palate midline [Cranial Nerves Accessory (XI - Cranial And Spinal)] : head turning and shoulder shrug symmetric [Cranial Nerves Hypoglossal (XII)] : there was no tongue deviation with protrusion [Sensation Tactile Decrease] : light touch was intact [Sensation Vibration Decrease] : vibration was intact [Dysesthesia] : dysesthesia was present [Hyperesthesia] : hyperesthesia was present [2+] : Patella left 2+ [Respiration, Rhythm And Depth] : normal respiratory rhythm and effort [Heart Sounds] : normal S1 and S2 [Arterial Pulses Carotid] : carotid pulses were normal with no bruits [Paresis Pronator Drift Right-Sided] : no pronator drift on the right [Paresis Pronator Drift Left-Sided] : no pronator drift on the left [Coordination - Dysmetria Impaired Finger-to-Nose Bilateral] : not present [FreeTextEntry6] : RUE 4/5 (pain limited), LUE 5/5\par BLE 4+/5 (pain limited) [FreeTextEntry7] : decreased pin prick in 2nd and 3rd digits in right hand, otherwise intact [FreeTextEntry8] : antalgic appearing gait; normal stance and stride

## 2023-02-17 NOTE — REASON FOR VISIT
[Initial Evaluation] : an initial evaluation [Spouse] : spouse [FreeTextEntry1] : diffuse pain, decreased mobility

## 2023-02-17 NOTE — REVIEW OF SYSTEMS
[Feeling Poorly] : feeling poorly [Feeling Tired] : feeling tired [As Noted in HPI] : as noted in HPI [Lower Ext Edema] : lower extremity edema [Diarrhea] : diarrhea [Arthralgias] : arthralgias [Joint Pain] : joint pain [Joint Stiffness] : joint stiffness [Limb Pain] : limb pain [Limb Swelling] : limb swelling [Negative] : Heme/Lymph

## 2023-02-17 NOTE — HISTORY OF PRESENT ILLNESS
[FreeTextEntry1] : Pt is a 54 yo F with hypothyroidism, peripheral edema, IBS, GERD, HTN, who presents with s/o diffuse pain and mobility issues. states that she has had multifocal joint pain for several years, particularly affecting the right shoulder, hand and hip. Due to the pain and stiffness, she has a hard time remaining in any single position for a long time, standing up, ambulating. Also endorses feeling pain and tingling with even light touch, especially in her legs. She was recently started on a water pill for swelling in her legs. Endorses numbness in her 2nd and 3rd digits of her right hand. Endorses stiffness and pain in her neck and low back. Denies b/b incontinence. Had a fall a few months ago, right legs gave out as she was walking up the stairs. Does not use any ambulatory assist devices. Denies family h/o autoimmune diseases.

## 2023-02-21 LAB
25(OH)D3 SERPL-MCNC: 7 NG/ML
ACE BLD-CCNC: 47 U/L
ALBUMIN MFR SERPL ELPH: 54.6 %
ALBUMIN SERPL-MCNC: 4.1 G/DL
ALBUMIN/GLOB SERPL: 1.2 RATIO
ALPHA1 GLOB MFR SERPL ELPH: 5.3 %
ALPHA1 GLOB SERPL ELPH-MCNC: 0.4 G/DL
ALPHA2 GLOB MFR SERPL ELPH: 11.8 %
ALPHA2 GLOB SERPL ELPH-MCNC: 0.9 G/DL
B-GLOBULIN MFR SERPL ELPH: 14.5 %
B-GLOBULIN SERPL ELPH-MCNC: 1.1 G/DL
CK SERPL-CCNC: 209 U/L
COPPER SERPL-MCNC: 133 UG/DL
CRP SERPL-MCNC: 14 MG/L
DSDNA AB SER-ACNC: <12 IU/ML
ENA SS-A AB SER IA-ACNC: <0.2 AL
ENA SS-B AB SER IA-ACNC: <0.2 AL
ERYTHROCYTE [SEDIMENTATION RATE] IN BLOOD BY WESTERGREN METHOD: 35 MM/HR
FOLATE SERPL-MCNC: 14.9 NG/ML
GAMMA GLOB FLD ELPH-MCNC: 1 G/DL
GAMMA GLOB MFR SERPL ELPH: 13.8 %
INTERPRETATION SERPL IEP-IMP: NORMAL
PROT SERPL-MCNC: 7.5 G/DL
PROT SERPL-MCNC: 7.5 G/DL
T4 SERPL-MCNC: 10.5 UG/DL
TSH SERPL-ACNC: 4.17 UIU/ML
VIT B12 SERPL-MCNC: 714 PG/ML

## 2023-03-01 LAB
ANACR T: NEGATIVE
B BURGDOR DNA SPEC QL NAA+PROBE: NEGATIVE
VIT B1 SERPL-MCNC: 154.8 NMOL/L
VIT B6 SERPL-MCNC: 5.4 UG/L
ZINC SERPL-MCNC: 95 UG/DL

## 2023-04-17 ENCOUNTER — NON-APPOINTMENT (OUTPATIENT)
Age: 53
End: 2023-04-17

## 2023-08-17 ENCOUNTER — INPATIENT (INPATIENT)
Facility: HOSPITAL | Age: 53
LOS: 2 days | Discharge: HOME | DRG: 720 | End: 2023-08-20
Attending: INTERNAL MEDICINE | Admitting: INTERNAL MEDICINE
Payer: MEDICAID

## 2023-08-17 VITALS
SYSTOLIC BLOOD PRESSURE: 198 MMHG | HEART RATE: 130 BPM | RESPIRATION RATE: 23 BRPM | DIASTOLIC BLOOD PRESSURE: 85 MMHG | OXYGEN SATURATION: 98 %
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
## 2023-08-17 DIAGNOSIS — J96.02 ACUTE RESPIRATORY FAILURE WITH HYPERCAPNIA: ICD-10-CM

## 2023-08-17 LAB
ALBUMIN SERPL ELPH-MCNC: 4.5 G/DL — SIGNIFICANT CHANGE UP (ref 3.5–5.2)
ALP SERPL-CCNC: 93 U/L — SIGNIFICANT CHANGE UP (ref 30–115)
ALT FLD-CCNC: 17 U/L — SIGNIFICANT CHANGE UP (ref 0–41)
ANION GAP SERPL CALC-SCNC: 20 MMOL/L — HIGH (ref 7–14)
AST SERPL-CCNC: 23 U/L — SIGNIFICANT CHANGE UP (ref 0–41)
BASE EXCESS BLDV CALC-SCNC: -12 MMOL/L — LOW (ref -2–3)
BASE EXCESS BLDV CALC-SCNC: 2.7 MMOL/L — SIGNIFICANT CHANGE UP (ref -2–3)
BASOPHILS # BLD AUTO: 0 K/UL — SIGNIFICANT CHANGE UP (ref 0–0.2)
BASOPHILS NFR BLD AUTO: 0 % — SIGNIFICANT CHANGE UP (ref 0–1)
BILIRUB SERPL-MCNC: 0.5 MG/DL — SIGNIFICANT CHANGE UP (ref 0.2–1.2)
BUN SERPL-MCNC: 17 MG/DL — SIGNIFICANT CHANGE UP (ref 10–20)
BURR CELLS BLD QL SMEAR: PRESENT — SIGNIFICANT CHANGE UP
CA-I SERPL-SCNC: 1.14 MMOL/L — LOW (ref 1.15–1.33)
CA-I SERPL-SCNC: 1.23 MMOL/L — SIGNIFICANT CHANGE UP (ref 1.15–1.33)
CALCIUM SERPL-MCNC: 9.5 MG/DL — SIGNIFICANT CHANGE UP (ref 8.4–10.4)
CHLORIDE SERPL-SCNC: 99 MMOL/L — SIGNIFICANT CHANGE UP (ref 98–110)
CO2 SERPL-SCNC: 20 MMOL/L — SIGNIFICANT CHANGE UP (ref 17–32)
CREAT SERPL-MCNC: 1.1 MG/DL — SIGNIFICANT CHANGE UP (ref 0.7–1.5)
D DIMER BLD IA.RAPID-MCNC: 576 NG/ML DDU — HIGH
EGFR: 60 ML/MIN/1.73M2 — SIGNIFICANT CHANGE UP
EOSINOPHIL # BLD AUTO: 0.22 K/UL — SIGNIFICANT CHANGE UP (ref 0–0.7)
EOSINOPHIL NFR BLD AUTO: 0.9 % — SIGNIFICANT CHANGE UP (ref 0–8)
GAS PNL BLDV: 136 MMOL/L — SIGNIFICANT CHANGE UP (ref 136–145)
GAS PNL BLDV: 138 MMOL/L — SIGNIFICANT CHANGE UP (ref 136–145)
GAS PNL BLDV: SIGNIFICANT CHANGE UP
GAS PNL BLDV: SIGNIFICANT CHANGE UP
GIANT PLATELETS BLD QL SMEAR: PRESENT — SIGNIFICANT CHANGE UP
GLUCOSE BLDC GLUCOMTR-MCNC: 83 MG/DL — SIGNIFICANT CHANGE UP (ref 70–99)
GLUCOSE SERPL-MCNC: 239 MG/DL — HIGH (ref 70–99)
HCO3 BLDV-SCNC: 20 MMOL/L — LOW (ref 22–29)
HCO3 BLDV-SCNC: 30 MMOL/L — HIGH (ref 22–29)
HCT VFR BLD CALC: 42.3 % — SIGNIFICANT CHANGE UP (ref 37–47)
HCT VFR BLDA CALC: 29 % — LOW (ref 39–51)
HCT VFR BLDA CALC: 37 % — LOW (ref 39–51)
HGB BLD CALC-MCNC: 12.2 G/DL — LOW (ref 12.6–17.4)
HGB BLD CALC-MCNC: 9.8 G/DL — LOW (ref 12.6–17.4)
HGB BLD-MCNC: 12 G/DL — SIGNIFICANT CHANGE UP (ref 12–16)
LACTATE BLDV-MCNC: 10.4 MMOL/L — CRITICAL HIGH (ref 0.5–2)
LACTATE BLDV-MCNC: 2 MMOL/L — SIGNIFICANT CHANGE UP (ref 0.5–2)
LACTATE SERPL-SCNC: 10.8 MMOL/L — CRITICAL HIGH (ref 0.7–2)
LYMPHOCYTES # BLD AUTO: 20.9 % — SIGNIFICANT CHANGE UP (ref 20.5–51.1)
LYMPHOCYTES # BLD AUTO: 5.1 K/UL — HIGH (ref 1.2–3.4)
MANUAL SMEAR VERIFICATION: SIGNIFICANT CHANGE UP
MCHC RBC-ENTMCNC: 22.9 PG — LOW (ref 27–31)
MCHC RBC-ENTMCNC: 28.4 G/DL — LOW (ref 32–37)
MCV RBC AUTO: 80.9 FL — LOW (ref 81–99)
MONOCYTES # BLD AUTO: 0.42 K/UL — SIGNIFICANT CHANGE UP (ref 0.1–0.6)
MONOCYTES NFR BLD AUTO: 1.7 % — SIGNIFICANT CHANGE UP (ref 1.7–9.3)
MRSA PCR RESULT.: NEGATIVE — SIGNIFICANT CHANGE UP
MYELOCYTES NFR BLD: 0.9 % — HIGH (ref 0–0)
NEUTROPHILS # BLD AUTO: 16.14 K/UL — HIGH (ref 1.4–6.5)
NEUTROPHILS NFR BLD AUTO: 66.1 % — SIGNIFICANT CHANGE UP (ref 42.2–75.2)
NT-PROBNP SERPL-SCNC: 1235 PG/ML — HIGH (ref 0–300)
PCO2 BLDV: 56 MMHG — HIGH (ref 39–42)
PCO2 BLDV: 74 MMHG — HIGH (ref 39–42)
PH BLDV: 7.03 — LOW (ref 7.32–7.43)
PH BLDV: 7.33 — SIGNIFICANT CHANGE UP (ref 7.32–7.43)
PLAT MORPH BLD: NORMAL — SIGNIFICANT CHANGE UP
PLATELET # BLD AUTO: 420 K/UL — HIGH (ref 130–400)
PMV BLD: 11.6 FL — HIGH (ref 7.4–10.4)
PO2 BLDV: 40 MMHG — SIGNIFICANT CHANGE UP
PO2 BLDV: 55 MMHG — SIGNIFICANT CHANGE UP
POIKILOCYTOSIS BLD QL AUTO: SLIGHT — SIGNIFICANT CHANGE UP
POLYCHROMASIA BLD QL SMEAR: SLIGHT — SIGNIFICANT CHANGE UP
POTASSIUM BLDV-SCNC: 3.4 MMOL/L — LOW (ref 3.5–5.1)
POTASSIUM BLDV-SCNC: 3.6 MMOL/L — SIGNIFICANT CHANGE UP (ref 3.5–5.1)
POTASSIUM SERPL-MCNC: 3.7 MMOL/L — SIGNIFICANT CHANGE UP (ref 3.5–5)
POTASSIUM SERPL-SCNC: 3.7 MMOL/L — SIGNIFICANT CHANGE UP (ref 3.5–5)
PROT SERPL-MCNC: 7.2 G/DL — SIGNIFICANT CHANGE UP (ref 6–8)
RBC # BLD: 5.23 M/UL — SIGNIFICANT CHANGE UP (ref 4.2–5.4)
RBC # FLD: 17 % — HIGH (ref 11.5–14.5)
RBC BLD AUTO: ABNORMAL
SAO2 % BLDV: 51.2 % — SIGNIFICANT CHANGE UP
SAO2 % BLDV: 70.1 % — SIGNIFICANT CHANGE UP
SCHISTOCYTES BLD QL AUTO: SLIGHT — SIGNIFICANT CHANGE UP
SODIUM SERPL-SCNC: 139 MMOL/L — SIGNIFICANT CHANGE UP (ref 135–146)
TROPONIN T SERPL-MCNC: 0.06 NG/ML — CRITICAL HIGH
TROPONIN T SERPL-MCNC: <0.01 NG/ML — SIGNIFICANT CHANGE UP
VARIANT LYMPHS # BLD: 9.5 % — HIGH (ref 0–5)
WBC # BLD: 24.42 K/UL — HIGH (ref 4.8–10.8)
WBC # FLD AUTO: 24.42 K/UL — HIGH (ref 4.8–10.8)

## 2023-08-17 PROCEDURE — 87449 NOS EACH ORGANISM AG IA: CPT

## 2023-08-17 PROCEDURE — 83605 ASSAY OF LACTIC ACID: CPT

## 2023-08-17 PROCEDURE — 82330 ASSAY OF CALCIUM: CPT

## 2023-08-17 PROCEDURE — 87040 BLOOD CULTURE FOR BACTERIA: CPT

## 2023-08-17 PROCEDURE — 94640 AIRWAY INHALATION TREATMENT: CPT

## 2023-08-17 PROCEDURE — 87640 STAPH A DNA AMP PROBE: CPT

## 2023-08-17 PROCEDURE — 83520 IMMUNOASSAY QUANT NOS NONAB: CPT

## 2023-08-17 PROCEDURE — 99291 CRITICAL CARE FIRST HOUR: CPT

## 2023-08-17 PROCEDURE — 0241U: CPT

## 2023-08-17 PROCEDURE — 84100 ASSAY OF PHOSPHORUS: CPT

## 2023-08-17 PROCEDURE — 84443 ASSAY THYROID STIM HORMONE: CPT

## 2023-08-17 PROCEDURE — 84132 ASSAY OF SERUM POTASSIUM: CPT

## 2023-08-17 PROCEDURE — 93970 EXTREMITY STUDY: CPT

## 2023-08-17 PROCEDURE — 71045 X-RAY EXAM CHEST 1 VIEW: CPT | Mod: 26

## 2023-08-17 PROCEDURE — 36415 COLL VENOUS BLD VENIPUNCTURE: CPT

## 2023-08-17 PROCEDURE — 71045 X-RAY EXAM CHEST 1 VIEW: CPT

## 2023-08-17 PROCEDURE — 0225U NFCT DS DNA&RNA 21 SARSCOV2: CPT

## 2023-08-17 PROCEDURE — 84165 PROTEIN E-PHORESIS SERUM: CPT

## 2023-08-17 PROCEDURE — 87641 MR-STAPH DNA AMP PROBE: CPT

## 2023-08-17 PROCEDURE — 84295 ASSAY OF SERUM SODIUM: CPT

## 2023-08-17 PROCEDURE — 84155 ASSAY OF PROTEIN SERUM: CPT

## 2023-08-17 PROCEDURE — 85027 COMPLETE CBC AUTOMATED: CPT

## 2023-08-17 PROCEDURE — 80053 COMPREHEN METABOLIC PANEL: CPT

## 2023-08-17 PROCEDURE — 85379 FIBRIN DEGRADATION QUANT: CPT

## 2023-08-17 PROCEDURE — 94660 CPAP INITIATION&MGMT: CPT

## 2023-08-17 PROCEDURE — C9113: CPT

## 2023-08-17 PROCEDURE — 85018 HEMOGLOBIN: CPT

## 2023-08-17 PROCEDURE — 83735 ASSAY OF MAGNESIUM: CPT

## 2023-08-17 PROCEDURE — 76604 US EXAM CHEST: CPT

## 2023-08-17 PROCEDURE — 83521 IG LIGHT CHAINS FREE EACH: CPT

## 2023-08-17 PROCEDURE — 84145 PROCALCITONIN (PCT): CPT

## 2023-08-17 PROCEDURE — 85025 COMPLETE CBC W/AUTO DIFF WBC: CPT

## 2023-08-17 PROCEDURE — 82803 BLOOD GASES ANY COMBINATION: CPT

## 2023-08-17 PROCEDURE — 84484 ASSAY OF TROPONIN QUANT: CPT

## 2023-08-17 PROCEDURE — 93306 TTE W/DOPPLER COMPLETE: CPT

## 2023-08-17 PROCEDURE — 85014 HEMATOCRIT: CPT

## 2023-08-17 PROCEDURE — 82962 GLUCOSE BLOOD TEST: CPT

## 2023-08-17 PROCEDURE — 84166 PROTEIN E-PHORESIS/URINE/CSF: CPT

## 2023-08-17 RX ORDER — LEVOTHYROXINE SODIUM 125 MCG
1 TABLET ORAL
Refills: 0 | DISCHARGE

## 2023-08-17 RX ORDER — VALSARTAN 80 MG/1
320 TABLET ORAL DAILY
Refills: 0 | Status: DISCONTINUED | OUTPATIENT
Start: 2023-08-17 | End: 2023-08-18

## 2023-08-17 RX ORDER — FAMOTIDINE 10 MG/ML
20 INJECTION INTRAVENOUS ONCE
Refills: 0 | Status: COMPLETED | OUTPATIENT
Start: 2023-08-17 | End: 2023-08-17

## 2023-08-17 RX ORDER — CEFTRIAXONE 500 MG/1
1000 INJECTION, POWDER, FOR SOLUTION INTRAMUSCULAR; INTRAVENOUS EVERY 24 HOURS
Refills: 0 | Status: DISCONTINUED | OUTPATIENT
Start: 2023-08-17 | End: 2023-08-20

## 2023-08-17 RX ORDER — AZITHROMYCIN 500 MG/1
500 TABLET, FILM COATED ORAL ONCE
Refills: 0 | Status: COMPLETED | OUTPATIENT
Start: 2023-08-17 | End: 2023-08-17

## 2023-08-17 RX ORDER — MAGNESIUM SULFATE 500 MG/ML
2 VIAL (ML) INJECTION ONCE
Refills: 0 | Status: COMPLETED | OUTPATIENT
Start: 2023-08-17 | End: 2023-08-17

## 2023-08-17 RX ORDER — ENOXAPARIN SODIUM 100 MG/ML
40 INJECTION SUBCUTANEOUS EVERY 24 HOURS
Refills: 0 | Status: DISCONTINUED | OUTPATIENT
Start: 2023-08-17 | End: 2023-08-20

## 2023-08-17 RX ORDER — IPRATROPIUM/ALBUTEROL SULFATE 18-103MCG
3 AEROSOL WITH ADAPTER (GRAM) INHALATION EVERY 4 HOURS
Refills: 0 | Status: DISCONTINUED | OUTPATIENT
Start: 2023-08-17 | End: 2023-08-20

## 2023-08-17 RX ORDER — DIPHENHYDRAMINE HCL 50 MG
25 CAPSULE ORAL EVERY 12 HOURS
Refills: 0 | Status: DISCONTINUED | OUTPATIENT
Start: 2023-08-17 | End: 2023-08-18

## 2023-08-17 RX ORDER — OMEPRAZOLE 10 MG/1
1 CAPSULE, DELAYED RELEASE ORAL
Refills: 0 | DISCHARGE

## 2023-08-17 RX ORDER — AZITHROMYCIN 500 MG/1
500 TABLET, FILM COATED ORAL EVERY 24 HOURS
Refills: 0 | Status: DISCONTINUED | OUTPATIENT
Start: 2023-08-17 | End: 2023-08-20

## 2023-08-17 RX ORDER — CEFTRIAXONE 500 MG/1
1000 INJECTION, POWDER, FOR SOLUTION INTRAMUSCULAR; INTRAVENOUS ONCE
Refills: 0 | Status: COMPLETED | OUTPATIENT
Start: 2023-08-17 | End: 2023-08-17

## 2023-08-17 RX ORDER — NITROGLYCERIN 6.5 MG
5 CAPSULE, EXTENDED RELEASE ORAL
Qty: 50 | Refills: 0 | Status: DISCONTINUED | OUTPATIENT
Start: 2023-08-17 | End: 2023-08-17

## 2023-08-17 RX ORDER — IPRATROPIUM/ALBUTEROL SULFATE 18-103MCG
3 AEROSOL WITH ADAPTER (GRAM) INHALATION
Refills: 0 | Status: DISCONTINUED | OUTPATIENT
Start: 2023-08-17 | End: 2023-08-20

## 2023-08-17 RX ORDER — FUROSEMIDE 40 MG
40 TABLET ORAL EVERY 12 HOURS
Refills: 0 | Status: DISCONTINUED | OUTPATIENT
Start: 2023-08-17 | End: 2023-08-18

## 2023-08-17 RX ORDER — LEVOTHYROXINE SODIUM 125 MCG
100 TABLET ORAL DAILY
Refills: 0 | Status: DISCONTINUED | OUTPATIENT
Start: 2023-08-17 | End: 2023-08-18

## 2023-08-17 RX ORDER — PANTOPRAZOLE SODIUM 20 MG/1
40 TABLET, DELAYED RELEASE ORAL EVERY 24 HOURS
Refills: 0 | Status: DISCONTINUED | OUTPATIENT
Start: 2023-08-17 | End: 2023-08-18

## 2023-08-17 RX ORDER — METOPROLOL TARTRATE 50 MG
25 TABLET ORAL EVERY 12 HOURS
Refills: 0 | Status: DISCONTINUED | OUTPATIENT
Start: 2023-08-17 | End: 2023-08-18

## 2023-08-17 RX ORDER — HYDRALAZINE HCL 50 MG
50 TABLET ORAL ONCE
Refills: 0 | Status: COMPLETED | OUTPATIENT
Start: 2023-08-17 | End: 2023-08-17

## 2023-08-17 RX ADMIN — AZITHROMYCIN 255 MILLIGRAM(S): 500 TABLET, FILM COATED ORAL at 13:18

## 2023-08-17 RX ADMIN — VALSARTAN 320 MILLIGRAM(S): 80 TABLET ORAL at 19:24

## 2023-08-17 RX ADMIN — Medication 60 MILLIGRAM(S): at 21:49

## 2023-08-17 RX ADMIN — PANTOPRAZOLE SODIUM 40 MILLIGRAM(S): 20 TABLET, DELAYED RELEASE ORAL at 18:55

## 2023-08-17 RX ADMIN — CEFTRIAXONE 100 MILLIGRAM(S): 500 INJECTION, POWDER, FOR SOLUTION INTRAMUSCULAR; INTRAVENOUS at 19:24

## 2023-08-17 RX ADMIN — Medication 1.5 MICROGRAM(S)/MIN: at 13:17

## 2023-08-17 RX ADMIN — CEFTRIAXONE 100 MILLIGRAM(S): 500 INJECTION, POWDER, FOR SOLUTION INTRAMUSCULAR; INTRAVENOUS at 13:17

## 2023-08-17 RX ADMIN — Medication 3 MILLILITER(S): at 21:14

## 2023-08-17 RX ADMIN — Medication 25 MILLIGRAM(S): at 19:25

## 2023-08-17 RX ADMIN — Medication 125 MILLIGRAM(S): at 16:45

## 2023-08-17 RX ADMIN — Medication 150 GRAM(S): at 18:57

## 2023-08-17 RX ADMIN — ENOXAPARIN SODIUM 40 MILLIGRAM(S): 100 INJECTION SUBCUTANEOUS at 18:57

## 2023-08-17 RX ADMIN — FAMOTIDINE 20 MILLIGRAM(S): 10 INJECTION INTRAVENOUS at 16:46

## 2023-08-17 RX ADMIN — Medication 100 MICROGRAM(S): at 19:25

## 2023-08-17 RX ADMIN — Medication 3 MILLILITER(S): at 15:58

## 2023-08-17 RX ADMIN — Medication 25 MILLIGRAM(S): at 21:48

## 2023-08-17 RX ADMIN — Medication 40 MILLIGRAM(S): at 18:55

## 2023-08-17 RX ADMIN — Medication 0.1 MILLIGRAM(S): at 19:25

## 2023-08-17 NOTE — H&P ADULT - NSHPLABSRESULTS_GEN_ALL_CORE
VITAL SIGNS: Last 24 Hours  T(C): --  T(F): --  HR: 105 (17 Aug 2023 15:17) (105 - 130)  BP: 97/50 (17 Aug 2023 15:17) (97/50 - 198/85)  BP(mean): 134 (17 Aug 2023 12:47) (134 - 134)  RR: 20 (17 Aug 2023 15:17) (19 - 23)  SpO2: 98% (17 Aug 2023 15:17) (98% - 100%)    LABS:                        12.0   24.42 )-----------( 420      ( 17 Aug 2023 12:35 )             42.3     08-17    139  |  99  |  17  ----------------------------<  239<H>  3.7   |  20  |  1.1    Ca    9.5      17 Aug 2023 12:35    TPro  7.2  /  Alb  4.5  /  TBili  0.5  /  DBili  x   /  AST  23  /  ALT  17  /  AlkPhos  93  08-17      Urinalysis Basic - ( 17 Aug 2023 12:35 )    Color: x / Appearance: x / SG: x / pH: x  Gluc: 239 mg/dL / Ketone: x  / Bili: x / Urobili: x   Blood: x / Protein: x / Nitrite: x   Leuk Esterase: x / RBC: x / WBC x   Sq Epi: x / Non Sq Epi: x / Bacteria: x        Lactate, Blood: 10.8 mmol/L *HH* (08-17-23 @ 12:35)  Troponin T, Serum: <0.01 ng/mL (08-17-23 @ 12:35)      CARDIAC MARKERS ( 17 Aug 2023 12:35 )  x     / <0.01 ng/mL / x     / x     / x          RADIOLOGY:

## 2023-08-17 NOTE — ED ADULT NURSE NOTE - NSFALLHARMRISKINTERV_ED_ALL_ED

## 2023-08-17 NOTE — ED PROVIDER NOTE - OBJECTIVE STATEMENT
Patient is a 53 year old female with PMH of GERD and Hypothyroidism presenting for shortness of breath. Per EMS, patient has a dairy allergy and last night she believed she was having an allergic reaction so she administered Benadryl. This morning patient woke up endorsing worsening shortness of breath so she injected her EpiPen as she thought she was having an allergic reaction. When EMS arrived, patient was found to be hypertensive (systolic BP >220), hypoxic (SpO2 in the 70-80s), and tachypneic; started on CPAP. Patient denies fevers, chest pain, nausea, vomiting, abdominal pain, tongue swelling, diarrhea, or additional complaints.

## 2023-08-17 NOTE — PHARMACOTHERAPY INTERVENTION NOTE - COMMENTS
Recommended obtaining a Legionella urinary antigen in the setting of azithromycin ordered empirically for suspected pneumonia.    Chris Singh PharmD  Clinical Pharmacy Specialist, Infectious Diseases  Tele-Antimicrobial Stewardship Program (Tele-ASP)  Tele-ASP Phone: [phone]

## 2023-08-17 NOTE — H&P ADULT - NSHPPHYSICALEXAM_GEN_ALL_CORE
PHYSICAL EXAM:  CONSTITUTIONAL: in moderate distress, AAOx3  PULMONARY: decrease air entry bilaterally with diffuse wheezing  CARDIOVASCULAR: Regular rate and rhythm; no audible murmurs, tachycardic  GASTROINTESTINAL: Soft, non-tender, distended; bowel sounds present  MUSCULOSKELETAL: 2+ peripheral pulses; +2 LLE  NEUROLOGY: non-focal  SKIN: No rashes or lesions; warm and dry

## 2023-08-17 NOTE — PATIENT PROFILE ADULT - FALL HARM RISK - HARM RISK INTERVENTIONS
Assistance with ambulation/Assistance OOB with selected safe patient handling equipment/Communicate Risk of Fall with Harm to all staff/Monitor gait and stability/Reinforce activity limits and safety measures with patient and family/Sit up slowly, dangle for a short time, stand at bedside before walking/Tailored Fall Risk Interventions/Visual Cue: Yellow wristband and red socks/Bed in lowest position, wheels locked, appropriate side rails in place/Call bell, personal items and telephone in reach/Instruct patient to call for assistance before getting out of bed or chair/Non-slip footwear when patient is out of bed/[place] to call system/Physically safe environment - no spills, clutter or unnecessary equipment/Purposeful Proactive Rounding/Room/bathroom lighting operational, light cord in reach

## 2023-08-17 NOTE — ED PROVIDER NOTE - NSICDXPASTMEDICALHX_GEN_ALL_CORE_FT
PAST MEDICAL HISTORY:  Colitis     GERD (gastroesophageal reflux disease)     Hypothyroid     No pertinent past medical history

## 2023-08-17 NOTE — ED PROVIDER NOTE - PHYSICAL EXAMINATION
VITAL SIGNS: I have reviewed nursing notes and confirm.  CONSTITUTIONAL: Non-toxic, in NAD  SKIN: Warm dry, normal skin turgor  HEAD: NCAT  EYES: No conjunctival injection, scleral anicteric  ENT: Moist mucous membranes, normal pharynx with no erythema or exudates  NECK: Supple; full ROM. Nontender. No cervical LAD  CARD: RRR, no murmurs, rubs or gallops  RESP: Diminished and diffuse rales  ABD: Soft, distended, non-tender, no rebound or guarding.   EXT: Full ROM, no bony tenderness, +1 bilateral pedal edema, no calf tenderness  NEURO: Normal motor, normal sensory.  PSYCH: Cooperative, appropriate.

## 2023-08-17 NOTE — H&P ADULT - NSICDXPASTMEDICALHX_GEN_ALL_CORE_FT
PAST MEDICAL HISTORY:  Asthma     Colitis     GERD (gastroesophageal reflux disease)     Hypothyroid

## 2023-08-17 NOTE — ED PROVIDER NOTE - PROGRESS NOTE DETAILS
SO: Patient's breathing and BP have improved from Nitro and BiPAP. Will reassess and admit to ICU versus Stepdown. Sepsis suspected at this time.   IVF bolus cannot be given due to: ( X) CHF ( ) CRF ( ) Hospice or comfort measures only ( ) Patient refusal

## 2023-08-17 NOTE — ED PROVIDER NOTE - CLINICAL SUMMARY MEDICAL DECISION MAKING FREE TEXT BOX
Patient evaluated for shortness of breath.  Bedside imaging vital signs and exam findings consistent with acute pulmonary edema.  Patient was given nitro drip.  There is improvement in her blood pressure.  Patient was placed on BiPAP for ventilatory support which improved her symptoms significantly.  Lab work reviewed Which showed findings consistent with acute hypercapnic respiratory failure, concerns for sepsis however IV fluids were withheld given her state of fluid overload.  IV antibiotics were started ICU consulted

## 2023-08-17 NOTE — ED PROVIDER NOTE - ATTENDING CONTRIBUTION TO CARE
Attending Statement: I have personally provided the amount of critical care time documented below excluding time spent on separate procedures.     Critical Care Time Spent (min) Must be 30 or more minutes to qualify: 35.    53 year old female with a pmh of htn hypothyroid gerd biba for sob. As per EMS patient has a history of allergies to dairy thought she was having an allergic reaction today was given epi by her , when ems arrived patient was found to be sating in the 50s, bp 240 systolic and tachycardic to 140, placed on cpap and brought to ed. PAtient states she thought she was having an allergic reaction yesterday and took benadryl however today sob feels different. No throat swelling, rash n/v/d abdominal pain cp   Upon arrival to ED patient immediately placed on bipap    on exam  CONSTITUTIONAL: tachypneic and hypoxic on room air   HEAD: NCAT  EYES: PERRL; EOMI;   ENT: Normal pharynx; mucous membranes pink/moist, no erythema.  NECK: Supple; no meningeal signs  CARD: RRR; nl S1/S2; no M/R/G.   RESP: tachypenic b/l crackles and rales   ABD: Soft, NT ND  MSK/EXT: No gross deformities; full range of motion.  SKIN: Warm and dry;   NEURO: AAOx3  PSYCH: Memory Intact, Normal Affect

## 2023-08-17 NOTE — ED PROVIDER NOTE - CARE PLAN
1 Principal Discharge DX:	Acute hypercapnic respiratory failure  Secondary Diagnosis:	Sepsis, unspecified organism  Secondary Diagnosis:	Acute pulmonary edema

## 2023-08-17 NOTE — CHART NOTE - NSCHARTNOTEFT_GEN_A_CORE
Patient with bigeminy. HR > 100. Started on metoprolol tartrate 25 BID Patient with bigeminy  HR > 100  Started on metoprolol tartrate 25 BID  Cardiology consulted

## 2023-08-17 NOTE — H&P ADULT - ASSESSMENT
Acute Hypoxemic respiratory failure  Hypertensive emergency  Asthma exacerbation/Anaphylaxis  HO GERD    PLAN:      CNS: avoid sedation    HEENT: Oral care    PULMONARY:  HOB @ 45 degrees.  Aspiration precautions, BiPAP on/off, solumedrol 60 TID, nebs q 4 hr, Benadryl, famotidine    CARDIOVASCULAR: avoid volume overload, MAP adequate, CE, BNP done, ECHO, Lasix iv BID for today    GI: GI prophylaxis.  NPO while on BIPAP    RENAL:  Follow up lytes.  Correct as needed, trend BMP, BP control, off nitro drip, resume home meds    INFECTIOUS DISEASE: Follow up cultures, procalcitonin, Rocephin , azithromycin    HEMATOLOGICAL:  DVT prophylaxis. DIMER, tryptase, repeat VBG,    ENDOCRINE:  Follow up FS.  Insulin protocol if needed.    MUSCULOSKELETAL: Bedrest    MICU   Acute Hypoxemic/hypercapnic respiratory failure  Hypertensive emergency  Asthma exacerbation  HO GERD    PLAN:      CNS: avoid sedation    HEENT: Oral care    PULMONARY:  HOB @ 45 degrees.  Aspiration precautions, BiPAP on/off, solumedrol 60 TID, nebs q 4 hr, Benadryl, famotidine    CARDIOVASCULAR: avoid volume overload, MAP adequate, CE, BNP done, ECHO, Lasix iv BID for today    GI: GI prophylaxis.  NPO while on BIPAP    RENAL:  Follow up lytes.  Correct as needed, trend BMP, BP control, off nitro drip, resume home meds    INFECTIOUS DISEASE: Follow up cultures, procalcitonin, Rocephin , azithromycin    HEMATOLOGICAL:  DVT prophylaxis. DIMER, tryptase, repeat VBG done showing improvement in pH and lactic acid,    ENDOCRINE:  Follow up FS.  Insulin protocol if needed.    MUSCULOSKELETAL: Bedrest    MICU                 Acute Hypoxemic/hypercapnic respiratory failure  Hypertensive emergency  Asthma exacerbation  HO GERD    PLAN:      CNS: avoid sedation    HEENT: Oral care    PULMONARY:  HOB @ 45 degrees.  Aspiration precautions, BiPAP on/off, solumedrol 60 TID, nebs q 4 hr, Benadryl, famotidine    CARDIOVASCULAR: avoid volume overload, MAP adequate, CE, BNP 1200 , ECHO, Lasix iv BID for today. CXR in AM.    GI: GI prophylaxis.  NPO while on BIPAP    RENAL:  Follow up lytes.  Correct as needed, trend BMP, BP control, off nitro drip, resume home meds valsartan and clonidine (she also takes hydrochlorothiazide 25mg OD)    INFECTIOUS DISEASE: Follow up cultures, procalcitonin, Rocephin , azithromycin    HEMATOLOGICAL:  DVT prophylaxis. DIMER, tryptase, repeat VBG done showing improvement in pH and lactic acid,    ENDOCRINE:  Follow up FS.  Insulin protocol if needed.    MUSCULOSKELETAL: Bedrest    MICU                 Acute Hypoxemic/hypercapnic respiratory failure  Hypertensive emergency- sp nitro drip  Asthma exacerbation  HO GERD  Leukocytosis- no focus of infection    PLAN:      CNS: avoid sedation    HEENT: Oral care    PULMONARY:  HOB @ 45 degrees.  Aspiration precautions, BiPAP on/off, solumedrol 60 TID, nebs q 4 hr, Benadryl    CARDIOVASCULAR: avoid volume overload, MAP adequate, CE, BNP 1200 , ECHO, Lasix iv BID for today. CXR in AM.    GI: GI prophylaxis.  NPO while on BIPAP    RENAL:  Follow up lytes.  Correct as needed, trend BMP, BP control, off nitro drip, resume home meds valsartan and clonidine (she also takes hydrochlorothiazide 25mg OD)    INFECTIOUS DISEASE: Follow up cultures, procalcitonin, Rocephin , azithromycin    HEMATOLOGICAL:  DVT prophylaxis. DIMER, tryptase, repeat VBG done showing improvement in pH and lactic acid,    ENDOCRINE:  Follow up FS.  Insulin protocol if needed.    MUSCULOSKELETAL: Bedrest    MICU

## 2023-08-17 NOTE — ED PROVIDER NOTE - TEST CONSIDERED BUT NOT PERFORMED
CT angio considered however patient's symptoms consistent with pulmonary edema Tests Considered But Not Performed

## 2023-08-17 NOTE — CONSULT NOTE ADULT - ASSESSMENT
IMPRESSION:  Acute Hypoxemic respiratory failure  Hypertensive emergency  Asthma exacerbation/Anaphylaxis  HO GERD    PLAN:      CNS: avoid sedation    HEENT: Oral care    PULMONARY:  HOB @ 45 degrees.  Aspiration precautions, BiPAP on/off, solumedrol 60 TID, nebs q 4 hr, Benadryl, famotidine    CARDIOVASCULAR: avoid volume overload, MAP adequate, CE, BNP, ECHO, Lasix iv BID for today    GI: GI prophylaxis.  NPO while on BIPAP    RENAL:  Follow up lytes.  Correct as needed, trend BMP, BP control, off nitro drip, resume home meds    INFECTIOUS DISEASE: Follow up cultures, procalcitonin, Rocephin , azithromycin    HEMATOLOGICAL:  DVT prophylaxis. DIMER, tryptase, repeat VBG,    ENDOCRINE:  Follow up FS.  Insulin protocol if needed.    MUSCULOSKELETAL: Bedrest    MICU

## 2023-08-17 NOTE — ED PROVIDER NOTE - EKG ADDITIONAL INFORMATION FREE TEXT
EKG is interpreted by me sinus tachycardia with a heart rate of 135.  148 QRS 88 QTc 432 sinus tachycardia no ST changes no T wave inversions normal intervals

## 2023-08-17 NOTE — CHART NOTE - NSCHARTNOTEFT_GEN_A_CORE
Notified by RN that patient cracked her tooth.   Assessed patient. R mandibular 2nd molar cracked, only half tooth intact. Patient reports no pain.  Placing dental consult for possible extraction.

## 2023-08-17 NOTE — CONSULT NOTE ADULT - SUBJECTIVE AND OBJECTIVE BOX
Patient is a 53y old  Female who presents with a chief complaint of SOB.    HPI:  Patient is a 53 year old female with PMH of GERD and Hypothyroidism, Hx of anaphylaxis-has epi pen, presenting for shortness of breath. Per EMS, patient has a dairy allergy and last night she believed she was having an allergic reaction so she administered Benadryl. This morning patient woke up endorsing worsening shortness of breath so she injected her EpiPen as she thought she was having an allergic reaction. When EMS arrived, patient was found to be hypertensive (systolic BP >220), hypoxic (SpO2 in the 70-80s), and tachypneic; started on CPAP. Patient denies fevers, chest pain, nausea, vomiting, abdominal pain, tongue swelling, diarrhea, or additional complaints.    PAST MEDICAL & SURGICAL HISTORY:  No pertinent past medical history      Hypothyroid      GERD (gastroesophageal reflux disease)      Colitis      No significant past surgical history          SOCIAL HX:  no Smoking                             FAMILY HISTORY:  No pertinent family history in first degree relatives    :  No known cardiovacular family hisotry     Review Of Systems:     All ROS are negative except per HPI       Allergies    dairy products (Anaphylaxis)  sulfa drugs (Rash; Urticaria)  shellfish (Unknown)  sulfa drugs (Unknown)  Milk (Anaphylaxis)    Intolerances          PHYSICAL EXAM    ICU Vital Signs Last 24 Hrs  T(C): --  T(F): --  HR: 112 (17 Aug 2023 14:01) (112 - 130)  BP: 109/57 (17 Aug 2023 14:01) (109/57 - 198/85)  BP(mean): 134 (17 Aug 2023 12:47) (134 - 134)  ABP: --  ABP(mean): --  RR: 20 (17 Aug 2023 14:01) (19 - 23)  SpO2: 98% (17 Aug 2023 14:01) (98% - 100%)    O2 Parameters below as of 17 Aug 2023 14:01  Patient On (Oxygen Delivery Method): BiPAP/CPAP            CONSTITUTIONAL:  On BIPAP    ENT:   Airway patent,   Mouth with normal mucosa.     CARDIAC:   Normal rate,   Regular rhythm.        RESPIRATORY:   Bilateral diffuse wheezing    GASTROINTESTINAL:  Abdomen soft,   Non-tender,   No guarding,   + BS    NEUROLOGICAL:   Alert and oriented   No motor deficits.    SKIN:   Skin normal color for race,   No evidence of rash.                LABS:                          12.0   24.42 )-----------( 420      ( 17 Aug 2023 12:35 )             42.3                                               08-17    139  |  99  |  17  ----------------------------<  239<H>  3.7   |  20  |  1.1    Ca    9.5      17 Aug 2023 12:35    TPro  7.2  /  Alb  4.5  /  TBili  0.5  /  DBili  x   /  AST  23  /  ALT  17  /  AlkPhos  93  08-17                                             Urinalysis Basic - ( 17 Aug 2023 12:35 )    Color: x / Appearance: x / SG: x / pH: x  Gluc: 239 mg/dL / Ketone: x  / Bili: x / Urobili: x   Blood: x / Protein: x / Nitrite: x   Leuk Esterase: x / RBC: x / WBC x   Sq Epi: x / Non Sq Epi: x / Bacteria: x        CARDIAC MARKERS ( 17 Aug 2023 12:35 )  x     / <0.01 ng/mL / x     / x     / x                                                LIVER FUNCTIONS - ( 17 Aug 2023 12:35 )  Alb: 4.5 g/dL / Pro: 7.2 g/dL / ALK PHOS: 93 U/L / ALT: 17 U/L / AST: 23 U/L / GGT: x                                                                                                                                       X-Rays                                                                            ECHOr      MEDICATIONS  (STANDING):  albuterol/ipratropium for Nebulization 3 milliLiter(s) Nebulizer every 20 minutes  famotidine Injectable 20 milliGRAM(s) IV Push once  hydrALAZINE 50 milliGRAM(s) Oral Once  methylPREDNISolone sodium succinate Injectable 125 milliGRAM(s) IV Push once  nitroglycerin  Infusion 5 MICROgram(s)/Min (1.5 mL/Hr) IV Continuous <Continuous>    MEDICATIONS  (PRN):

## 2023-08-17 NOTE — H&P ADULT - HISTORY OF PRESENT ILLNESS
Case of 53-year-old female with PMH of GERD and Hypothyroidism presenting for shortness of breath.    Per EMS, patient has a dairy allergy and last night she believed she was having an allergic reaction so she administered Benadryl.    This morning patient woke up endorsing worsening shortness of breath so she injected her EpiPen as she thought she was having an allergic reaction.    When EMS arrived, patient was found to be hypertensive (systolic BP >220), hypoxic (SpO2 in the 70-80s), and tachypneic; started on CPAP.    Patient denies fevers, chest pain, nausea, vomiting, abdominal pain, tongue swelling, diarrhea, or additional complaints.    In the ED, vitals showed /85 and HR of 130, afebrile  Labs showed WBC of 24k, lactate 10.8, ProBNP 1200  VBG showed pH 7, CO2 74, O2 55, bicarb 20, and lactic 10  Patient started on nitro drip and bipap  Repeat VBG showed pH 7.33, pCO2 56, O2 40, and lactic of 2.  CXR showed bilateral infiltrates    Patient admitted for HTN emergency. Case of 53-year-old female with PMH of Asthma/COPD, GERD, multiple allergies, and Hypothyroidism presenting for shortness of breath.    History goes back to one day ago when patient ate multiple things with her nephews around 6pm, she had some mild shortness of breath, took her ventolin and went to sleep. Today at 11am, she woke up with shortness of breath and dizziness. She has multiple history of allergies so she thought she was having an allergic reaction, took a shot of epi and called EMS.    When EMS arrived, patient was found to be hypertensive (systolic BP >220), hypoxic (SpO2 in the 70-80s), and tachypneic; started on CPAP.    On ROS, patient denies fevers, chest pain, nausea, vomiting, abdominal pain, tongue swelling, diarrhea, or urinary symptoms. No sick contacts.    In the ED, vitals showed /85 and HR of 130, afebrile  Labs showed WBC of 24k, lactate 10.8, ProBNP 1200  VBG showed pH 7, CO2 74, O2 55, bicarb 20, and lactic 10  Patient started on nitro drip and bipap  Repeat VBG showed pH 7.33, pCO2 56, O2 40, and lactic of 2.  CXR showed bilateral infiltrates    Patient admitted for HTN emergency with possible asthmaexacerbation Case of 53-year-old female with PMH of HTN, Asthma/COPD, GERD, multiple allergies, and hypothyroidism presenting for shortness of breath.    History goes back to one day ago when patient ate multiple things with her nephews around 6pm, she had some mild shortness of breath, took her ventolin and went to sleep. Today at 11am, she woke up with shortness of breath and dizziness. She has multiple history of allergies so she thought she was having an allergic reaction, took a shot of epi and called EMS.    When EMS arrived, patient was found to be hypertensive (systolic BP >220), hypoxic (SpO2 in the 70-80s), and tachypneic; started on CPAP.    On ROS, patient denies fevers, chest pain, nausea, vomiting, abdominal pain, tongue swelling, diarrhea, or urinary symptoms. No sick contacts.    Of note, patient says she saw a pulmonologist at the [facility] who diagnosed her with asthma/copd. I called the pharmacy, patient was supposed to be on Breo but never picked up because insurance didnt cover. She is also on torsemide for LLE but no clear cardiac history. She used to follow with an allergist for her allergy history.    In the ED, vitals showed /85 and HR of 130, afebrile  Labs showed WBC of 24k, lactate 10.8, ProBNP 1200  VBG showed pH 7, CO2 74, O2 55, bicarb 20, and lactic 10  Patient started on nitro drip and bipap  Repeat VBG showed pH 7.33, pCO2 56, O2 40, and lactic of 2.  CXR showed bilateral infiltrates    Patient admitted for HTN emergency with possible asthma exacerbation Case of 53-year-old female with PMH of HTN, Asthma/COPD (never intubated), GERD, multiple allergies (with history of anaphylactic shock), and hypothyroidism presenting for shortness of breath.    History goes back to one day ago when patient ate multiple things with her nephews around 6pm, she had some mild shortness of breath, took her ventolin and went to sleep. Today at 11am, she woke up with shortness of breath and dizziness. She has multiple history of allergies so she thought she was having an allergic reaction, took a shot of epi and called EMS.    When EMS arrived, patient was found to be hypertensive (systolic BP >220), hypoxic (SpO2 in the 70-80s), and tachypneic; started on CPAP.    On ROS, patient denies fevers, chest pain, nausea, vomiting, abdominal pain, tongue swelling, diarrhea, or urinary symptoms. No sick contacts.    Of note, patient says she saw a pulmonologist at the [facility] who diagnosed her with asthma/copd. I called the pharmacy, patient was supposed to be on Breo but never picked up because insurance didnt cover. She is also on torsemide for LLE but no clear cardiac history. She used to follow with an allergist for her allergy history.    In the ED, vitals showed /85 and HR of 130, afebrile  Labs showed WBC of 24k, lactate 10.8, ProBNP 1200  VBG showed pH 7, CO2 74, O2 55, bicarb 20, and lactic 10  Patient started on nitro drip and bipap  Repeat VBG showed pH 7.33, pCO2 56, O2 40, and lactic of 2.  CXR showed bilateral infiltrates    Patient admitted for HTN emergency with possible asthma exacerbation Case of 53-year-old female with PMH of HTN, Asthma/COPD (never intubated), GERD, multiple allergies (with history of anaphylactic shock as per the patient), and hypothyroidism presenting for shortness of breath.    History goes back to one day ago when patient ate multiple things with her nephews around 6pm, she had some mild shortness of breath, took her ventolin and went to sleep. Today at 11am, she woke up with shortness of breath and dizziness. She has multiple history of allergies so she thought she was having an allergic reaction, took a shot of epi and called EMS.    When EMS arrived, patient was found to be hypertensive (systolic BP >220), hypoxic (SpO2 in the 70-80s), and tachypneic; started on CPAP.    On ROS, patient denies fevers, chest pain, nausea, vomiting, abdominal pain, tongue swelling, diarrhea, or urinary symptoms. No sick contacts.    Of note, patient says she saw a pulmonologist at the [facility] who diagnosed her with asthma/copd. I called the pharmacy, patient was supposed to be on Breo but never picked up because insurance didnt cover. She is also on torsemide for LLE but no clear cardiac history. She used to follow with an allergist for her allergy history.    In the ED, vitals showed /85 and HR of 130, afebrile  Labs showed WBC of 24k, lactate 10.8, ProBNP 1200  VBG showed pH 7, CO2 74, O2 55, bicarb 20, and lactic 10  Patient started on nitro drip and bipap  Repeat VBG showed pH 7.33, pCO2 56, O2 40, and lactic of 2.  CXR showed bilateral infiltrates    Patient admitted for HTN emergency with possible asthma exacerbation

## 2023-08-18 LAB
ALBUMIN SERPL ELPH-MCNC: 3.9 G/DL — SIGNIFICANT CHANGE UP (ref 3.5–5.2)
ALP SERPL-CCNC: 72 U/L — SIGNIFICANT CHANGE UP (ref 30–115)
ALT FLD-CCNC: 13 U/L — SIGNIFICANT CHANGE UP (ref 0–41)
ANION GAP SERPL CALC-SCNC: 11 MMOL/L — SIGNIFICANT CHANGE UP (ref 7–14)
AST SERPL-CCNC: 18 U/L — SIGNIFICANT CHANGE UP (ref 0–41)
BASOPHILS # BLD AUTO: 0.01 K/UL — SIGNIFICANT CHANGE UP (ref 0–0.2)
BASOPHILS NFR BLD AUTO: 0.1 % — SIGNIFICANT CHANGE UP (ref 0–1)
BILIRUB SERPL-MCNC: 0.4 MG/DL — SIGNIFICANT CHANGE UP (ref 0.2–1.2)
BUN SERPL-MCNC: 19 MG/DL — SIGNIFICANT CHANGE UP (ref 10–20)
CALCIUM SERPL-MCNC: 9.1 MG/DL — SIGNIFICANT CHANGE UP (ref 8.4–10.4)
CHLORIDE SERPL-SCNC: 103 MMOL/L — SIGNIFICANT CHANGE UP (ref 98–110)
CO2 SERPL-SCNC: 27 MMOL/L — SIGNIFICANT CHANGE UP (ref 17–32)
CREAT SERPL-MCNC: 0.8 MG/DL — SIGNIFICANT CHANGE UP (ref 0.7–1.5)
EGFR: 88 ML/MIN/1.73M2 — SIGNIFICANT CHANGE UP
EOSINOPHIL # BLD AUTO: 0 K/UL — SIGNIFICANT CHANGE UP (ref 0–0.7)
EOSINOPHIL NFR BLD AUTO: 0 % — SIGNIFICANT CHANGE UP (ref 0–8)
FLUAV AG NPH QL: SIGNIFICANT CHANGE UP
FLUBV AG NPH QL: SIGNIFICANT CHANGE UP
GLUCOSE SERPL-MCNC: 142 MG/DL — HIGH (ref 70–99)
HCT VFR BLD CALC: 34.1 % — LOW (ref 37–47)
HGB BLD-MCNC: 10.1 G/DL — LOW (ref 12–16)
IMM GRANULOCYTES NFR BLD AUTO: 0.6 % — HIGH (ref 0.1–0.3)
LACTATE SERPL-SCNC: 1.4 MMOL/L — SIGNIFICANT CHANGE UP (ref 0.7–2)
LYMPHOCYTES # BLD AUTO: 0.73 K/UL — LOW (ref 1.2–3.4)
LYMPHOCYTES # BLD AUTO: 6.8 % — LOW (ref 20.5–51.1)
MAGNESIUM SERPL-MCNC: 2.2 MG/DL — SIGNIFICANT CHANGE UP (ref 1.8–2.4)
MCHC RBC-ENTMCNC: 22.7 PG — LOW (ref 27–31)
MCHC RBC-ENTMCNC: 29.6 G/DL — LOW (ref 32–37)
MCV RBC AUTO: 76.8 FL — LOW (ref 81–99)
MONOCYTES # BLD AUTO: 0.08 K/UL — LOW (ref 0.1–0.6)
MONOCYTES NFR BLD AUTO: 0.7 % — LOW (ref 1.7–9.3)
NEUTROPHILS # BLD AUTO: 9.82 K/UL — HIGH (ref 1.4–6.5)
NEUTROPHILS NFR BLD AUTO: 91.8 % — HIGH (ref 42.2–75.2)
NRBC # BLD: 0 /100 WBCS — SIGNIFICANT CHANGE UP (ref 0–0)
PHOSPHATE SERPL-MCNC: 4 MG/DL — SIGNIFICANT CHANGE UP (ref 2.1–4.9)
PLATELET # BLD AUTO: 243 K/UL — SIGNIFICANT CHANGE UP (ref 130–400)
PMV BLD: 11.6 FL — HIGH (ref 7.4–10.4)
POTASSIUM SERPL-MCNC: 4 MMOL/L — SIGNIFICANT CHANGE UP (ref 3.5–5)
POTASSIUM SERPL-SCNC: 4 MMOL/L — SIGNIFICANT CHANGE UP (ref 3.5–5)
PROCALCITONIN SERPL-MCNC: 0.22 NG/ML — HIGH (ref 0.02–0.1)
PROT SERPL-MCNC: 6.1 G/DL — SIGNIFICANT CHANGE UP (ref 6–8)
RBC # BLD: 4.44 M/UL — SIGNIFICANT CHANGE UP (ref 4.2–5.4)
RBC # FLD: 16.9 % — HIGH (ref 11.5–14.5)
RSV RNA NPH QL NAA+NON-PROBE: SIGNIFICANT CHANGE UP
SARS-COV-2 RNA SPEC QL NAA+PROBE: SIGNIFICANT CHANGE UP
SODIUM SERPL-SCNC: 141 MMOL/L — SIGNIFICANT CHANGE UP (ref 135–146)
T4 FREE+ TSH PNL SERPL: 0.71 UIU/ML — SIGNIFICANT CHANGE UP (ref 0.27–4.2)
TROPONIN T SERPL-MCNC: <0.01 NG/ML — SIGNIFICANT CHANGE UP
WBC # BLD: 10.7 K/UL — SIGNIFICANT CHANGE UP (ref 4.8–10.8)
WBC # FLD AUTO: 10.7 K/UL — SIGNIFICANT CHANGE UP (ref 4.8–10.8)

## 2023-08-18 PROCEDURE — 99221 1ST HOSP IP/OBS SF/LOW 40: CPT

## 2023-08-18 PROCEDURE — 71045 X-RAY EXAM CHEST 1 VIEW: CPT | Mod: 26

## 2023-08-18 PROCEDURE — 99223 1ST HOSP IP/OBS HIGH 75: CPT

## 2023-08-18 PROCEDURE — 93970 EXTREMITY STUDY: CPT | Mod: 26

## 2023-08-18 PROCEDURE — 76604 US EXAM CHEST: CPT | Mod: 26

## 2023-08-18 PROCEDURE — 93306 TTE W/DOPPLER COMPLETE: CPT | Mod: 26

## 2023-08-18 RX ORDER — DIPHENHYDRAMINE HCL 50 MG
25 CAPSULE ORAL EVERY 12 HOURS
Refills: 0 | Status: DISCONTINUED | OUTPATIENT
Start: 2023-08-18 | End: 2023-08-20

## 2023-08-18 RX ORDER — PANTOPRAZOLE SODIUM 20 MG/1
40 TABLET, DELAYED RELEASE ORAL
Refills: 0 | Status: DISCONTINUED | OUTPATIENT
Start: 2023-08-18 | End: 2023-08-20

## 2023-08-18 RX ORDER — VALSARTAN 80 MG/1
320 TABLET ORAL AT BEDTIME
Refills: 0 | Status: DISCONTINUED | OUTPATIENT
Start: 2023-08-18 | End: 2023-08-20

## 2023-08-18 RX ORDER — LEVOTHYROXINE SODIUM 125 MCG
100 TABLET ORAL AT BEDTIME
Refills: 0 | Status: DISCONTINUED | OUTPATIENT
Start: 2023-08-18 | End: 2023-08-18

## 2023-08-18 RX ORDER — LEVOTHYROXINE SODIUM 125 MCG
100 TABLET ORAL DAILY
Refills: 0 | Status: DISCONTINUED | OUTPATIENT
Start: 2023-08-18 | End: 2023-08-20

## 2023-08-18 RX ORDER — CHLORHEXIDINE GLUCONATE 213 G/1000ML
1 SOLUTION TOPICAL
Refills: 0 | Status: DISCONTINUED | OUTPATIENT
Start: 2023-08-18 | End: 2023-08-20

## 2023-08-18 RX ORDER — FUROSEMIDE 40 MG
40 TABLET ORAL ONCE
Refills: 0 | Status: DISCONTINUED | OUTPATIENT
Start: 2023-08-18 | End: 2023-08-18

## 2023-08-18 RX ORDER — CARVEDILOL PHOSPHATE 80 MG/1
6.25 CAPSULE, EXTENDED RELEASE ORAL EVERY 12 HOURS
Refills: 0 | Status: DISCONTINUED | OUTPATIENT
Start: 2023-08-18 | End: 2023-08-20

## 2023-08-18 RX ADMIN — Medication 0.1 MILLIGRAM(S): at 17:34

## 2023-08-18 RX ADMIN — Medication 60 MILLIGRAM(S): at 05:14

## 2023-08-18 RX ADMIN — Medication 25 MILLIGRAM(S): at 05:15

## 2023-08-18 RX ADMIN — Medication 0.1 MILLIGRAM(S): at 05:14

## 2023-08-18 RX ADMIN — CEFTRIAXONE 100 MILLIGRAM(S): 500 INJECTION, POWDER, FOR SOLUTION INTRAMUSCULAR; INTRAVENOUS at 18:43

## 2023-08-18 RX ADMIN — VALSARTAN 320 MILLIGRAM(S): 80 TABLET ORAL at 21:05

## 2023-08-18 RX ADMIN — Medication 20 MILLIGRAM(S): at 21:05

## 2023-08-18 RX ADMIN — Medication 25 MILLIGRAM(S): at 21:30

## 2023-08-18 RX ADMIN — Medication 100 MICROGRAM(S): at 16:54

## 2023-08-18 RX ADMIN — CHLORHEXIDINE GLUCONATE 1 APPLICATION(S): 213 SOLUTION TOPICAL at 05:15

## 2023-08-18 RX ADMIN — ENOXAPARIN SODIUM 40 MILLIGRAM(S): 100 INJECTION SUBCUTANEOUS at 18:06

## 2023-08-18 RX ADMIN — Medication 40 MILLIGRAM(S): at 05:14

## 2023-08-18 RX ADMIN — CARVEDILOL PHOSPHATE 6.25 MILLIGRAM(S): 80 CAPSULE, EXTENDED RELEASE ORAL at 17:35

## 2023-08-18 RX ADMIN — Medication 3 MILLILITER(S): at 20:09

## 2023-08-18 RX ADMIN — AZITHROMYCIN 255 MILLIGRAM(S): 500 TABLET, FILM COATED ORAL at 17:34

## 2023-08-18 RX ADMIN — PANTOPRAZOLE SODIUM 40 MILLIGRAM(S): 20 TABLET, DELAYED RELEASE ORAL at 18:23

## 2023-08-18 RX ADMIN — Medication 40 MILLIGRAM(S): at 17:35

## 2023-08-18 RX ADMIN — Medication 3 MILLILITER(S): at 00:43

## 2023-08-18 NOTE — CHART NOTE - NSCHARTNOTEFT_GEN_A_CORE
Transfer Note :    Transfer from CCU   to    Floor       Handoff:  -f/u cultures , Procal , RVP ,MRSA   -f/u duplex   -f/u ECHO   -f/u repeat labs           HPI :  Case of 53-year-old female with PMH of HTN, Asthma/COPD (never intubated), GERD, multiple allergies (with history of anaphylactic shock as per the patient), and hypothyroidism presenting for shortness of breath.    History goes back to one day ago when patient ate multiple things with her nephews around 6pm, she had some mild shortness of breath, took her ventolin and went to sleep. Today at 11am, she woke up with shortness of breath and dizziness. She has multiple history of allergies so she thought she was having an allergic reaction, took a shot of epi and called EMS.    Of note, patient says she saw a pulmonologist at the [facility] who diagnosed her with asthma/copd. I called the pharmacy, patient was supposed to be on Breo but never picked up because insurance didnt cover. She is also on torsemide for LLE but no clear cardiac history. She used to follow with an allergist for her allergy history      ED Course :  In the ED, vitals showed /85 and HR of 130, afebrile  Labs showed WBC of 24k, lactate 10.8, ProBNP 1200  VBG showed pH 7, CO2 74, O2 55, bicarb 20, and lactic 10  Patient started on nitro drip and bipap  Repeat VBG showed pH 7.33, pCO2 56, O2 40, and lactic of 2.  CXR showed bilateral infiltrates    Patient admitted for HTN emergency with possible asthma exacerbation        CCU Course :   Patient came to CCU on 8/17.  Pt was on  Aspiration precautions, BiPAP as needed, Solumedrol 40mg BID. nebs q 4 hr.   Repeated ABG was with improvement in hypercapnia. Repeat CXR for b/l infiltrates was noted.   Repeated CE was negative. BNP elevated. Lasix 40 mg IV once was given ; resumed her oral diuretic.   Echocardiogram ordered . BP was  controlled on antihypertensives. Repeat lactate was normal. Metoprolol for the   PVC/bigemini was given.  ON Rocephin 1g IVPB and azithromycin 500mg IVBP.  Elevated D-dimers noted  and ordered Duplex LE.            IMPRESSION:    Acute Hypoxemic respiratory failure  Hypertensive emergency  Asthma exacerbation/Anaphylaxis  HO GERD  Pulmonary edema   Questionable pneumonia    PLAN:      CNS: avoid sedation. MS at baseline.     HEENT: Oral care    PULMONARY:  HOB @ 45 degrees.  Aspiration precautions, BiPAP as needed, Solumedrol 40mg BID. nebs q 4 hr. Needs to be on maintenance inhalers when stable. Repeat ABG noted with improvement in hypercapnia. Repeat CXR noted with bilateral inf. VBG today.     CARDIOVASCULAR: Repeat CE negative. BNP elevated. Lasix 40 mg IV once; resume her oral diuretic. Echocardiogram. BP controlled now. Repeat lactate normalized. Metoprolol for the PVC/bigemini.     GI: GI prophylaxis. Oral diet while off BIPAP.     RENAL:  Follow up lytes.  Correct as needed. Resume home antihypertensive medications.     INFECTIOUS DISEASE: Follow up cultures, procalcitonin, Rocephin , azithromycin for now. RVP panel. Nasal MRSA.     HEMATOLOGICAL:  DVT prophylaxis. Check duplex lower extremities. D-dimer noted and elevated.    ENDOCRINE:  Follow up FS.  Insulin protocol if needed.    MUSCULOSKELETAL: Out of bed.     Transfer to SDU. Transfer Note :    Transfer from CCU   to    Floor       Handoff:  -f/u cultures , Procal ,MRSA   -f/u duplex   -f/u ECHO   -f/u repeat labs           HPI :  Case of 53-year-old female with PMH of HTN, Asthma/COPD (never intubated), GERD, multiple allergies (with history of anaphylactic shock as per the patient), and hypothyroidism presenting for shortness of breath.    History goes back to one day ago when patient ate multiple things with her nephews around 6pm, she had some mild shortness of breath, took her ventolin and went to sleep. Today at 11am, she woke up with shortness of breath and dizziness. She has multiple history of allergies so she thought she was having an allergic reaction, took a shot of epi and called EMS.    Of note, patient says she saw a pulmonologist at the [facility] [place] who diagnosed her with asthma/copd. I called the pharmacy, patient was supposed to be on Breo but never picked up because insurance didnt cover. She is also on torsemide for LLE but no clear cardiac history. She used to follow with an allergist for her allergy history      ED Course :  In the ED, vitals showed /85 and HR of 130, afebrile  Labs showed WBC of 24k, lactate 10.8, ProBNP 1200  VBG showed pH 7, CO2 74, O2 55, bicarb 20, and lactic 10  Patient started on nitro drip and bipap  Repeat VBG showed pH 7.33, pCO2 56, O2 40, and lactic of 2.  CXR showed bilateral infiltrates    Patient admitted for HTN emergency with possible asthma exacerbation        CCU Course :   Patient came to CCU on 8/17.  Pt was on  Aspiration precautions, BiPAP as needed, Solumedrol 40mg BID. nebs q 4 hr.   Repeated ABG was with improvement in hypercapnia. Repeat CXR for b/l infiltrates was noted.   Repeated CE was negative. BNP elevated. Lasix 40 mg IV once was given ; resumed her oral diuretic.   Echocardiogram ordered . BP was  controlled on antihypertensives. Repeat lactate was normal. Metoprolol for the   PVC/bigemini was given.  ON Rocephin 1g IVPB and azithromycin 500mg IVBP.  Elevated D-dimers noted  and ordered Duplex LE.  RVP came back negative           IMPRESSION:    Acute Hypoxemic respiratory failure  Hypertensive emergency  Asthma exacerbation/Anaphylaxis  HO GERD  Pulmonary edema   Questionable pneumonia    PLAN:      CNS: avoid sedation. MS at baseline.     HEENT: Oral care    PULMONARY:  HOB @ 45 degrees.  Aspiration precautions, BiPAP as needed, Solumedrol 40mg BID. nebs q 4 hr. Needs to be on maintenance inhalers when stable. Repeat ABG noted with improvement in hypercapnia. Repeat CXR noted with bilateral inf. VBG today.     CARDIOVASCULAR: Repeat CE negative. BNP elevated. Lasix 40 mg IV once; resume her oral diuretic. Echocardiogram. BP controlled now. Repeat lactate normalized. Metoprolol for the PVC/bigemini.     GI: GI prophylaxis. Oral diet while off BIPAP.     RENAL:  Follow up lytes.  Correct as needed. Resume home antihypertensive medications.     INFECTIOUS DISEASE: Follow up cultures, procalcitonin, Rocephin , azithromycin for now. RVP panel. Nasal MRSA.     HEMATOLOGICAL:  DVT prophylaxis. Check duplex lower extremities. D-dimer noted and elevated.    ENDOCRINE:  Follow up FS.  Insulin protocol if needed.    MUSCULOSKELETAL: Out of bed.     Transfer to SDU. Transfer Note :    Transfer from CCU   to    Floor       Handoff:  -f/u cultures , Procal ,MRSA   -f/u duplex   -f/u repeat labs           HPI :  Case of 53-year-old female with PMH of HTN, Asthma/COPD (never intubated), GERD, multiple allergies (with history of anaphylactic shock as per the patient), and hypothyroidism presenting for shortness of breath.    History goes back to one day ago when patient ate multiple things with her nephews around 6pm, she had some mild shortness of breath, took her ventolin and went to sleep. Today at 11am, she woke up with shortness of breath and dizziness. She has multiple history of allergies so she thought she was having an allergic reaction, took a shot of epi and called EMS.    Of note, patient says she saw a pulmonologist at the [facility] who diagnosed her with asthma/copd. I called the pharmacy, patient was supposed to be on Breo but never picked up because insurance didn't cover. She is also on torsemide for LLE but no clear cardiac history. She used to follow with an allergist for her allergy history      ED Course :  In the ED, vitals showed /85 and HR of 130, afebrile  Labs showed WBC of 24k, lactate 10.8, ProBNP 1200  VBG showed pH 7, CO2 74, O2 55, bicarb 20, and lactic 10  Patient started on nitro drip and bipap  Repeat VBG showed pH 7.33, pCO2 56, O2 40, and lactic of 2.  CXR showed bilateral infiltrates    Patient admitted for HTN emergency with possible asthma exacerbation        CCU Course :   Patient came to CCU on 8/17.  Pt was on  Aspiration precautions, BiPAP as needed, Solumedrol 40mg BID. nebs q 4 hr.   Repeated ABG was with improvement in hypercapnia. Repeat CXR for b/l infiltrates was noted.   Repeated CE was negative. BNP elevated. Lasix 40 mg IV once was given ; resumed her oral diuretic.   Echocardiogram  showed diastolic dysfunction of grade 3 with EF of 55. . BP was  controlled on antihypertensives. Repeat lactate was normal. Metoprolol for the   PVC/bigemini was given.  ON Rocephin 1g IVPB and azithromycin 500mg IVBP.  Elevated D-dimers noted  and ordered Duplex LE.  RVP came back negative           IMPRESSION:    Acute Hypoxemic respiratory failure  Hypertensive emergency  Asthma exacerbation/Anaphylaxis  HO GERD  Pulmonary edema   Questionable pneumonia    PLAN:      CNS: avoid sedation. MS at baseline.     HEENT: Oral care    PULMONARY:  HOB @ 45 degrees.  Aspiration precautions, BiPAP as needed, Solumedrol 40mg BID. nebs q 4 hr. Needs to be on maintenance inhalers when stable. Repeat ABG noted with improvement in hypercapnia. Repeat CXR noted with bilateral inf. VBG today.     CARDIOVASCULAR: Repeat CE negative. BNP elevated. Lasix 40 mg IV once; resume her oral diuretic. Echocardiogram. BP controlled now. Repeat lactate normalized. Metoprolol for the PVC/bigemini.     GI: GI prophylaxis. Oral diet while off BIPAP.     RENAL:  Follow up lytes.  Correct as needed. Resume home antihypertensive medications.     INFECTIOUS DISEASE: Follow up cultures, procalcitonin, Rocephin , azithromycin for now. RVP panel. Nasal MRSA.     HEMATOLOGICAL:  DVT prophylaxis. Check duplex lower extremities. D-dimer noted and elevated.    ENDOCRINE:  Follow up FS.  Insulin protocol if needed.    MUSCULOSKELETAL: Out of bed.     Transfer to SDU.

## 2023-08-18 NOTE — CONSULT NOTE ADULT - SUBJECTIVE AND OBJECTIVE BOX
Cardiologist:    HPI:  Case of 53-year-old female with PMH of HTN, Asthma/COPD (never intubated), GERD, multiple allergies (with history of anaphylactic shock as per the patient), and hypothyroidism presenting for shortness of breath.    History goes back to one day ago when patient ate multiple things with her nephews around 6pm, she had some mild shortness of breath, took her ventolin and went to sleep. Today at 11am, she woke up with shortness of breath and dizziness. She has multiple history of allergies so she thought she was having an allergic reaction, took a shot of epi and called EMS.    When EMS arrived, patient was found to be hypertensive (systolic BP >220), hypoxic (SpO2 in the 70-80s), and tachypneic; started on CPAP.    On ROS, patient denies fevers, chest pain, nausea, vomiting, abdominal pain, tongue swelling, diarrhea, or urinary symptoms. No sick contacts.    Of note, patient says she saw a pulmonologist at the [facility] who diagnosed her with asthma/copd. I called the pharmacy, patient was supposed to be on Breo but never picked up because insurance didnt cover. She is also on torsemide for LLE but no clear cardiac history. She used to follow with an allergist for her allergy history.    In the ED, vitals showed /85 and HR of 130, afebrile  Labs showed WBC of 24k, lactate 10.8, ProBNP 1200  VBG showed pH 7, CO2 74, O2 55, bicarb 20, and lactic 10  Patient started on nitro drip and bipap  Repeat VBG showed pH 7.33, pCO2 56, O2 40, and lactic of 2.  CXR showed bilateral infiltrates    Patient admitted for HTN emergency with possible asthma exacerbation (17 Aug 2023 15:38)      Cardiology Consult HPI:  Pt admitted for hypertensive emergency/ pulm edema and  acute hypoxic hypercapnic resp failure 2/2 pulm edema and asthma exacerbation. Cardiology consulted for ventricular bigeminy on tele .     PAST MEDICAL & SURGICAL HISTORY  Hypothyroid    GERD (gastroesophageal reflux disease)    Colitis    Asthma    No significant past surgical history        FAMILY HISTORY:  FAMILY HISTORY:  No pertinent family history in first degree relatives      [ ] no pertinent family history of premature cardiovascular disease in first degree relatives.  Mother:   Father:   Siblings:     SOCIAL HISTORY:  []smoker  []Alcohol  []Drug    ALLERGIES:  dairy products (Anaphylaxis)  sulfa drugs (Rash; Urticaria)  shellfish (Unknown)  sulfa drugs (Unknown)  Milk (Anaphylaxis)      MEDICATIONS:  MEDICATIONS  (STANDING):  albuterol/ipratropium for Nebulization 3 milliLiter(s) Nebulizer every 20 minutes  albuterol/ipratropium for Nebulization 3 milliLiter(s) Nebulizer every 4 hours  azithromycin  IVPB 500 milliGRAM(s) IV Intermittent every 24 hours  cefTRIAXone   IVPB 1000 milliGRAM(s) IV Intermittent every 24 hours  chlorhexidine 2% Cloths 1 Application(s) Topical <User Schedule>  cloNIDine 0.1 milliGRAM(s) Oral every 12 hours  enoxaparin Injectable 40 milliGRAM(s) SubCutaneous every 24 hours  furosemide   Injectable 40 milliGRAM(s) IV Push once  levothyroxine 100 MICROGram(s) Oral at bedtime  methylPREDNISolone sodium succinate Injectable 40 milliGRAM(s) IV Push every 12 hours  metoprolol tartrate 25 milliGRAM(s) Oral every 12 hours  pantoprazole  Injectable 40 milliGRAM(s) IV Push every 24 hours  valsartan 320 milliGRAM(s) Oral at bedtime    MEDICATIONS  (PRN):  diphenhydrAMINE 25 milliGRAM(s) Oral every 12 hours PRN Allergy symptoms      HOME MEDICATIONS:  Home Medications:  cloNIDine 0.1 mg oral tablet: 1 tab(s) orally once a day (17 Aug 2023 16:41)  dicyclomine 20 mg oral tablet: 1 tab(s) orally 2 times a day (17 Aug 2023 16:42)  levothyroxine 100 mcg (0.1 mg) oral tablet: 1 tab(s) orally once a day (17 Aug 2023 16:41)  omeprazole 40 mg oral delayed release capsule: 1 tab(s) orally once a day (17 Aug 2023 16:41)  torsemide 20 mg oral tablet: 1 tab(s) orally once a day (17 Aug 2023 16:41)  valsartan-hydrochlorothiazide 320 mg-25 mg oral tablet: 1 tab(s) orally once a day (17 Aug 2023 16:42)      VITALS:   T(F): 97.7 (08-18 @ 12:00), Max: 98.5 (08-17 @ 17:30)  HR: 85 (08-18 @ 12:00) (70 - 130)  BP: 139/67 (08-18 @ 12:00) (90/51 - 198/85)  BP(mean): 96 (08-18 @ 12:00) (67 - 134)  RR: 26 (08-18 @ 12:00) (19 - 46)  SpO2: 96% (08-18 @ 12:00) (77% - 100%)    I&O's Summary    17 Aug 2023 07:01  -  18 Aug 2023 07:00  --------------------------------------------------------  IN: 220 mL / OUT: 2150 mL / NET: -1930 mL    18 Aug 2023 07:01  -  18 Aug 2023 12:09  --------------------------------------------------------  IN: 550 mL / OUT: 0 mL / NET: 550 mL        REVIEW OF SYSTEMS:    Negative except as mentioned in HPI    CONSTITUTIONAL: No weakness, fevers or chills  EYES: No visual changes  ENT: No vertigo or throat pain   NECK: No pain or stiffness  RESPIRATORY: No cough, wheezing, hemoptysis; No shortness of breath  CARDIOVASCULAR: No chest pain or palpitations  GASTROINTESTINAL: No abdominal or epigastric pain. No nausea, vomiting, or hematemesis; No diarrhea or constipation. No melena or hematochezia.  GENITOURINARY: No dysuria, frequency or hematuria  NEUROLOGICAL: No numbness or weakness  SKIN: No itching, no rashes  MSK: FROM x4    PHYSICAL EXAM:  NEURO: Awake , alert and oriented  GEN: Not in acute distress  NECK: No JVD  LUNGS: Clear to auscultation bilaterally   CARDIOVASCULAR: S1/S2 present, RRR , no murmurs or rubs, no carotid bruits,  + PP bilaterally  ABD: Soft, non-tender, non-distended, +BS  EXT: No DENA  SKIN: Intact    LABS:                        10.1   10.70 )-----------( 243      ( 18 Aug 2023 05:05 )             34.1     08-18    141  |  103  |  19  ----------------------------<  142<H>  4.0   |  27  |  0.8    Ca    9.1      18 Aug 2023 05:05  Phos  4.0     08-18  Mg     2.2     08-18    TPro  6.1  /  Alb  3.9  /  TBili  0.4  /  DBili  x   /  AST  18  /  ALT  13  /  AlkPhos  72  08-18      Lactate, Blood: 1.4 mmol/L (08-18-23 @ 05:05)  Troponin T, Serum: <0.01 ng/mL (08-18-23 @ 05:05)  Troponin T, Serum: 0.06 ng/mL *HH* (08-17-23 @ 20:18)  Lactate, Blood: 10.8 mmol/L *HH* (08-17-23 @ 12:35)  Troponin T, Serum: <0.01 ng/mL (08-17-23 @ 12:35)    CARDIAC MARKERS ( 18 Aug 2023 05:05 )  x     / <0.01 ng/mL / x     / x     / x      CARDIAC MARKERS ( 17 Aug 2023 20:18 )  x     / 0.06 ng/mL / x     / x     / x      CARDIAC MARKERS ( 17 Aug 2023 12:35 )  x     / <0.01 ng/mL / x     / x     / x            Troponin trend:          RADIOLOGY:  -CXR: b/l diffuse infiltrates  -TTE:   Normal global left ventricular systolic function.   2. LV Ejection Fraction by Arana's Method with a biplane EF of 55 %.   3. Mild concentric left ventricular hypertrophy.   4. Normal right ventricular size and function.   5. Mild mitral valve regurgitation.   6. Elevated mean left atrial pressure.   7. Spectral Doppler shows restrictive pattern of left ventricular   myocardial filling (Grade III diastolic dysfunction).   8. Adequate TR velocity was not obtained to accurately assess RVSP.   9. Endocardial visualization was enhanced with intravenous echo contrast.        ECG:  Sinus tachycardia with occasional Premature ventricular complexes  Nonspecific ST abnormality  TELEMETRY EVENTS:  BELLO butler noted Cardiologist:    HPI:  Case of 53-year-old female with PMH of HTN, Asthma/COPD (never intubated), GERD, multiple allergies (with history of anaphylactic shock as per the patient), and hypothyroidism presenting for shortness of breath.    History goes back to one day ago when patient ate multiple things with her nephews around 6pm, she had some mild shortness of breath, took her ventolin and went to sleep. Today at 11am, she woke up with shortness of breath and dizziness. She has multiple history of allergies so she thought she was having an allergic reaction, took a shot of epi and called EMS.    When EMS arrived, patient was found to be hypertensive (systolic BP >220), hypoxic (SpO2 in the 70-80s), and tachypneic; started on CPAP.    On ROS, patient denies fevers, chest pain, nausea, vomiting, abdominal pain, tongue swelling, diarrhea, or urinary symptoms. No sick contacts.    Of note, patient says she saw a pulmonologist at the [facility] who diagnosed her with asthma/copd. I called the pharmacy, patient was supposed to be on Breo but never picked up because insurance didnt cover. She is also on torsemide for LLE but no clear cardiac history. She used to follow with an allergist for her allergy history.    In the ED, vitals showed /85 and HR of 130, afebrile  Labs showed WBC of 24k, lactate 10.8, ProBNP 1200  VBG showed pH 7, CO2 74, O2 55, bicarb 20, and lactic 10  Patient started on nitro drip and bipap  Repeat VBG showed pH 7.33, pCO2 56, O2 40, and lactic of 2.  CXR showed bilateral infiltrates    Patient admitted for HTN emergency with possible asthma exacerbation (17 Aug 2023 15:38)      Cardiology Consult HPI:  Pt admitted for anaphylaxis/ hypertensive emergency/ pulm edema and  acute hypoxic hypercapnic resp failure 2/2 pulm edema and asthma exacerbation. Cardiology consulted for ventricular bigeminy on tele .     PAST MEDICAL & SURGICAL HISTORY  Hypothyroid    GERD (gastroesophageal reflux disease)    Colitis    Asthma    No significant past surgical history        FAMILY HISTORY:  FAMILY HISTORY:  No pertinent family history in first degree relatives      [ ] no pertinent family history of premature cardiovascular disease in first degree relatives.  Mother:   Father:   Siblings:     SOCIAL HISTORY:  []smoker  []Alcohol  []Drug    ALLERGIES:  dairy products (Anaphylaxis)  sulfa drugs (Rash; Urticaria)  shellfish (Unknown)  sulfa drugs (Unknown)  Milk (Anaphylaxis)      MEDICATIONS:  MEDICATIONS  (STANDING):  albuterol/ipratropium for Nebulization 3 milliLiter(s) Nebulizer every 20 minutes  albuterol/ipratropium for Nebulization 3 milliLiter(s) Nebulizer every 4 hours  azithromycin  IVPB 500 milliGRAM(s) IV Intermittent every 24 hours  cefTRIAXone   IVPB 1000 milliGRAM(s) IV Intermittent every 24 hours  chlorhexidine 2% Cloths 1 Application(s) Topical <User Schedule>  cloNIDine 0.1 milliGRAM(s) Oral every 12 hours  enoxaparin Injectable 40 milliGRAM(s) SubCutaneous every 24 hours  furosemide   Injectable 40 milliGRAM(s) IV Push once  levothyroxine 100 MICROGram(s) Oral at bedtime  methylPREDNISolone sodium succinate Injectable 40 milliGRAM(s) IV Push every 12 hours  metoprolol tartrate 25 milliGRAM(s) Oral every 12 hours  pantoprazole  Injectable 40 milliGRAM(s) IV Push every 24 hours  valsartan 320 milliGRAM(s) Oral at bedtime    MEDICATIONS  (PRN):  diphenhydrAMINE 25 milliGRAM(s) Oral every 12 hours PRN Allergy symptoms      HOME MEDICATIONS:  Home Medications:  cloNIDine 0.1 mg oral tablet: 1 tab(s) orally once a day (17 Aug 2023 16:41)  dicyclomine 20 mg oral tablet: 1 tab(s) orally 2 times a day (17 Aug 2023 16:42)  levothyroxine 100 mcg (0.1 mg) oral tablet: 1 tab(s) orally once a day (17 Aug 2023 16:41)  omeprazole 40 mg oral delayed release capsule: 1 tab(s) orally once a day (17 Aug 2023 16:41)  torsemide 20 mg oral tablet: 1 tab(s) orally once a day (17 Aug 2023 16:41)  valsartan-hydrochlorothiazide 320 mg-25 mg oral tablet: 1 tab(s) orally once a day (17 Aug 2023 16:42)      VITALS:   T(F): 97.7 (08-18 @ 12:00), Max: 98.5 (08-17 @ 17:30)  HR: 85 (08-18 @ 12:00) (70 - 130)  BP: 139/67 (08-18 @ 12:00) (90/51 - 198/85)  BP(mean): 96 (08-18 @ 12:00) (67 - 134)  RR: 26 (08-18 @ 12:00) (19 - 46)  SpO2: 96% (08-18 @ 12:00) (77% - 100%)    I&O's Summary    17 Aug 2023 07:01  -  18 Aug 2023 07:00  --------------------------------------------------------  IN: 220 mL / OUT: 2150 mL / NET: -1930 mL    18 Aug 2023 07:01  -  18 Aug 2023 12:09  --------------------------------------------------------  IN: 550 mL / OUT: 0 mL / NET: 550 mL        REVIEW OF SYSTEMS:    Negative except as mentioned in HPI    CONSTITUTIONAL: No weakness, fevers or chills  EYES: No visual changes  ENT: No vertigo or throat pain   NECK: No pain or stiffness  RESPIRATORY: No cough, wheezing, hemoptysis; No shortness of breath  CARDIOVASCULAR: No chest pain or palpitations  GASTROINTESTINAL: No abdominal or epigastric pain. No nausea, vomiting, or hematemesis; No diarrhea or constipation. No melena or hematochezia.  GENITOURINARY: No dysuria, frequency or hematuria  NEUROLOGICAL: No numbness or weakness  SKIN: No itching, no rashes  MSK: FROM x4    PHYSICAL EXAM:  NEURO: Awake , alert and oriented  GEN: Not in acute distress  NECK: No JVD  LUNGS: Clear to auscultation bilaterally   CARDIOVASCULAR: S1/S2 present, RRR , no murmurs or rubs, no carotid bruits,  + PP bilaterally  ABD: Soft, non-tender, non-distended, +BS  EXT: No DENA  SKIN: Intact    LABS:                        10.1   10.70 )-----------( 243      ( 18 Aug 2023 05:05 )             34.1     08-18    141  |  103  |  19  ----------------------------<  142<H>  4.0   |  27  |  0.8    Ca    9.1      18 Aug 2023 05:05  Phos  4.0     08-18  Mg     2.2     08-18    TPro  6.1  /  Alb  3.9  /  TBili  0.4  /  DBili  x   /  AST  18  /  ALT  13  /  AlkPhos  72  08-18      Lactate, Blood: 1.4 mmol/L (08-18-23 @ 05:05)  Troponin T, Serum: <0.01 ng/mL (08-18-23 @ 05:05)  Troponin T, Serum: 0.06 ng/mL *HH* (08-17-23 @ 20:18)  Lactate, Blood: 10.8 mmol/L *HH* (08-17-23 @ 12:35)  Troponin T, Serum: <0.01 ng/mL (08-17-23 @ 12:35)    CARDIAC MARKERS ( 18 Aug 2023 05:05 )  x     / <0.01 ng/mL / x     / x     / x      CARDIAC MARKERS ( 17 Aug 2023 20:18 )  x     / 0.06 ng/mL / x     / x     / x      CARDIAC MARKERS ( 17 Aug 2023 12:35 )  x     / <0.01 ng/mL / x     / x     / x            Troponin trend:          RADIOLOGY:  -CXR: b/l diffuse infiltrates  -TTE:   Normal global left ventricular systolic function.   2. LV Ejection Fraction by Arana's Method with a biplane EF of 55 %.   3. Mild concentric left ventricular hypertrophy.   4. Normal right ventricular size and function.   5. Mild mitral valve regurgitation.   6. Elevated mean left atrial pressure.   7. Spectral Doppler shows restrictive pattern of left ventricular   myocardial filling (Grade III diastolic dysfunction).   8. Adequate TR velocity was not obtained to accurately assess RVSP.   9. Endocardial visualization was enhanced with intravenous echo contrast.        ECG:  Sinus tachycardia with occasional Premature ventricular complexes  Nonspecific ST abnormality  TELEMETRY EVENTS:  BELLO butler noted

## 2023-08-18 NOTE — CONSULT NOTE ADULT - SUBJECTIVE AND OBJECTIVE BOX
Patient is a 53y old  Female who presents with a chief complaint of     HPI:  Case of 53-year-old female with PMH of HTN, Asthma/COPD (never intubated), GERD, multiple allergies (with history of anaphylactic shock as per the patient), and hypothyroidism presenting for shortness of breath.    History goes back to one day ago when patient ate multiple things with her nephews around 6pm, she had some mild shortness of breath, took her ventolin and went to sleep. Today at 11am, she woke up with shortness of breath and dizziness. She has multiple history of allergies so she thought she was having an allergic reaction, took a shot of epi and called EMS.    When EMS arrived, patient was found to be hypertensive (systolic BP >220), hypoxic (SpO2 in the 70-80s), and tachypneic; started on CPAP.    On ROS, patient denies fevers, chest pain, nausea, vomiting, abdominal pain, tongue swelling, diarrhea, or urinary symptoms. No sick contacts.    Of note, patient says she saw a pulmonologist at the [facility] who diagnosed her with asthma/copd. I called the pharmacy, patient was supposed to be on Breo but never picked up because insurance didnt cover. She is also on torsemide for LLE but no clear cardiac history. She used to follow with an allergist for her allergy history.    In the ED, vitals showed /85 and HR of 130, afebrile  Labs showed WBC of 24k, lactate 10.8, ProBNP 1200  VBG showed pH 7, CO2 74, O2 55, bicarb 20, and lactic 10  Patient started on nitro drip and bipap  Repeat VBG showed pH 7.33, pCO2 56, O2 40, and lactic of 2.  CXR showed bilateral infiltrates    Patient admitted for HTN emergency with possible asthma exacerbation (17 Aug 2023 15:38)      PAST MEDICAL & SURGICAL HISTORY:  Hypothyroid      GERD (gastroesophageal reflux disease)      Colitis      Asthma      No significant past surgical history            FAMILY HISTORY:  No pertinent family history in first degree relatives    :  No known cardiovacular family hisotry     ROS:  See HPI     Allergies    dairy products (Anaphylaxis)  sulfa drugs (Rash; Urticaria)  shellfish (Unknown)  sulfa drugs (Unknown)  Milk (Anaphylaxis)    Intolerances          PHYSICAL EXAM    ICU Vital Signs Last 24 Hrs  T(C): 36.5 (18 Aug 2023 04:00), Max: 36.9 (17 Aug 2023 17:30)  T(F): 97.7 (18 Aug 2023 04:00), Max: 98.5 (17 Aug 2023 17:30)  HR: 81 (18 Aug 2023 06:00) (70 - 130)  BP: 121/69 (18 Aug 2023 06:00) (90/51 - 198/85)  BP(mean): 89 (18 Aug 2023 06:00) (67 - 134)  ABP: --  ABP(mean): --  RR: 20 (18 Aug 2023 06:00) (19 - 35)  SpO2: 96% (18 Aug 2023 06:00) (95% - 100%)    O2 Parameters below as of 18 Aug 2023 07:00  Patient On (Oxygen Delivery Method): nasal cannula w/ humidification  O2 Flow (L/min): 6          General: In NAD   HEENT:  KUMAR              Lymphatic system: No cervical LN   Lungs: Bilateral BS, crackles   Cardiovascular: Regular  Gastrointestinal: Soft, Positive BS  Musculoskeletal: No clubbing.  Moves all extremities.  Full range of motion   Skin: Warm.  Intact  Neurological: No motor or sensory deficit       08-17-23 @ 07:01  -  08-18-23 @ 07:00  --------------------------------------------------------  IN:    Oral Fluid: 220 mL  Total IN: 220 mL    OUT:    Voided (mL): 2150 mL  Total OUT: 2150 mL    Total NET: -1930 mL          LABS:                          10.1   10.70 )-----------( 243      ( 18 Aug 2023 05:05 )             34.1                                               08-18    141  |  103  |  19  ----------------------------<  142<H>  4.0   |  27  |  0.8    Ca    9.1      18 Aug 2023 05:05  Phos  4.0     08-18  Mg     2.2     08-18    TPro  6.1  /  Alb  3.9  /  TBili  0.4  /  DBili  x   /  AST  18  /  ALT  13  /  AlkPhos  72  08-18                                             Urinalysis Basic - ( 18 Aug 2023 05:05 )    Color: x / Appearance: x / SG: x / pH: x  Gluc: 142 mg/dL / Ketone: x  / Bili: x / Urobili: x   Blood: x / Protein: x / Nitrite: x   Leuk Esterase: x / RBC: x / WBC x   Sq Epi: x / Non Sq Epi: x / Bacteria: x        CARDIAC MARKERS ( 18 Aug 2023 05:05 )  x     / <0.01 ng/mL / x     / x     / x      CARDIAC MARKERS ( 17 Aug 2023 20:18 )  x     / 0.06 ng/mL / x     / x     / x      CARDIAC MARKERS ( 17 Aug 2023 12:35 )  x     / <0.01 ng/mL / x     / x     / x                                                LIVER FUNCTIONS - ( 18 Aug 2023 05:05 )  Alb: 3.9 g/dL / Pro: 6.1 g/dL / ALK PHOS: 72 U/L / ALT: 13 U/L / AST: 18 U/L / GGT: x                                                                                                                                       X-Rays bilateral opacities and infiltrates                                                                                    ECHO    MEDICATIONS  (STANDING):  albuterol/ipratropium for Nebulization 3 milliLiter(s) Nebulizer every 20 minutes  albuterol/ipratropium for Nebulization 3 milliLiter(s) Nebulizer every 4 hours  azithromycin  IVPB 500 milliGRAM(s) IV Intermittent every 24 hours  cefTRIAXone   IVPB 1000 milliGRAM(s) IV Intermittent every 24 hours  chlorhexidine 2% Cloths 1 Application(s) Topical <User Schedule>  cloNIDine 0.1 milliGRAM(s) Oral every 12 hours  diphenhydrAMINE 25 milliGRAM(s) Oral every 12 hours  enoxaparin Injectable 40 milliGRAM(s) SubCutaneous every 24 hours  furosemide   Injectable 40 milliGRAM(s) IV Push every 12 hours  levothyroxine 100 MICROGram(s) Oral at bedtime  methylPREDNISolone sodium succinate Injectable 60 milliGRAM(s) IV Push three times a day  metoprolol tartrate 25 milliGRAM(s) Oral every 12 hours  pantoprazole  Injectable 40 milliGRAM(s) IV Push every 24 hours  valsartan 320 milliGRAM(s) Oral at bedtime    MEDICATIONS  (PRN):         Patient is a 53y old  Female who presents with a chief complaint of     HPI:    Case of 53-year-old female with PMH of HTN, Asthma/COPD (never intubated), GERD, multiple allergies (with history of anaphylactic shock as per the patient), and hypothyroidism presenting for shortness of breath.    History goes back to one day ago when patient ate multiple things with her nephews around 6pm, she had some mild shortness of breath, took her ventolin and went to sleep. Today at 11am, she woke up with shortness of breath and dizziness. She has multiple history of allergies so she thought she was having an allergic reaction, took a shot of epi and called EMS.    When EMS arrived, patient was found to be hypertensive (systolic BP >220), hypoxic (SpO2 in the 70-80s), and tachypneic; started on CPAP.    On ROS, patient denies fevers, chest pain, nausea, vomiting, abdominal pain, tongue swelling, diarrhea, or urinary symptoms. No sick contacts.    Of note, patient says she saw a pulmonologist at the [facility] who diagnosed her with asthma/copd. I called the pharmacy, patient was supposed to be on Breo but never picked up because insurance didnt cover. She is also on torsemide for LLE but no clear cardiac history. She used to follow with an allergist for her allergy history.    In the ED, vitals showed /85 and HR of 130, afebrile  Labs showed WBC of 24k, lactate 10.8, ProBNP 1200  VBG showed pH 7, CO2 74, O2 55, bicarb 20, and lactic 10  Patient started on nitro drip and bipap  Repeat VBG showed pH 7.33, pCO2 56, O2 40, and lactic of 2.  CXR showed bilateral infiltrates    Patient admitted for HTN emergency with possible asthma exacerbation (17 Aug 2023 15:38)      PAST MEDICAL & SURGICAL HISTORY:  Hypothyroid      GERD (gastroesophageal reflux disease)      Colitis      Asthma      No significant past surgical history            FAMILY HISTORY:  No pertinent family history in first degree relatives    :  No known cardiovacular family hisotry     ROS:  See HPI     Allergies    dairy products (Anaphylaxis)  sulfa drugs (Rash; Urticaria)  shellfish (Unknown)  sulfa drugs (Unknown)  Milk (Anaphylaxis)    Intolerances          PHYSICAL EXAM    ICU Vital Signs Last 24 Hrs  T(C): 36.5 (18 Aug 2023 04:00), Max: 36.9 (17 Aug 2023 17:30)  T(F): 97.7 (18 Aug 2023 04:00), Max: 98.5 (17 Aug 2023 17:30)  HR: 81 (18 Aug 2023 06:00) (70 - 130)  BP: 121/69 (18 Aug 2023 06:00) (90/51 - 198/85)  BP(mean): 89 (18 Aug 2023 06:00) (67 - 134)  ABP: --  ABP(mean): --  RR: 20 (18 Aug 2023 06:00) (19 - 35)  SpO2: 96% (18 Aug 2023 06:00) (95% - 100%)    O2 Parameters below as of 18 Aug 2023 07:00  Patient On (Oxygen Delivery Method): nasal cannula w/ humidification  O2 Flow (L/min): 6          General: In NAD   HEENT:  KUMAR              Lymphatic system: No cervical LN   Lungs: Bilateral BS, crackles   Cardiovascular: Regular  Gastrointestinal: Soft, Positive BS  Musculoskeletal: No clubbing.  Moves all extremities.  Full range of motion   Skin: Warm.  Intact  Neurological: No motor or sensory deficit       08-17-23 @ 07:01  -  08-18-23 @ 07:00  --------------------------------------------------------  IN:    Oral Fluid: 220 mL  Total IN: 220 mL    OUT:    Voided (mL): 2150 mL  Total OUT: 2150 mL    Total NET: -1930 mL          LABS:                          10.1   10.70 )-----------( 243      ( 18 Aug 2023 05:05 )             34.1                                               08-18    141  |  103  |  19  ----------------------------<  142<H>  4.0   |  27  |  0.8    Ca    9.1      18 Aug 2023 05:05  Phos  4.0     08-18  Mg     2.2     08-18    TPro  6.1  /  Alb  3.9  /  TBili  0.4  /  DBili  x   /  AST  18  /  ALT  13  /  AlkPhos  72  08-18                                             Urinalysis Basic - ( 18 Aug 2023 05:05 )    Color: x / Appearance: x / SG: x / pH: x  Gluc: 142 mg/dL / Ketone: x  / Bili: x / Urobili: x   Blood: x / Protein: x / Nitrite: x   Leuk Esterase: x / RBC: x / WBC x   Sq Epi: x / Non Sq Epi: x / Bacteria: x        CARDIAC MARKERS ( 18 Aug 2023 05:05 )  x     / <0.01 ng/mL / x     / x     / x      CARDIAC MARKERS ( 17 Aug 2023 20:18 )  x     / 0.06 ng/mL / x     / x     / x      CARDIAC MARKERS ( 17 Aug 2023 12:35 )  x     / <0.01 ng/mL / x     / x     / x                                                LIVER FUNCTIONS - ( 18 Aug 2023 05:05 )  Alb: 3.9 g/dL / Pro: 6.1 g/dL / ALK PHOS: 72 U/L / ALT: 13 U/L / AST: 18 U/L / GGT: x                                                                                                                                       X-Rays bilateral opacities and infiltrates                                                                                    ECHO    MEDICATIONS  (STANDING):  albuterol/ipratropium for Nebulization 3 milliLiter(s) Nebulizer every 20 minutes  albuterol/ipratropium for Nebulization 3 milliLiter(s) Nebulizer every 4 hours  azithromycin  IVPB 500 milliGRAM(s) IV Intermittent every 24 hours  cefTRIAXone   IVPB 1000 milliGRAM(s) IV Intermittent every 24 hours  chlorhexidine 2% Cloths 1 Application(s) Topical <User Schedule>  cloNIDine 0.1 milliGRAM(s) Oral every 12 hours  diphenhydrAMINE 25 milliGRAM(s) Oral every 12 hours  enoxaparin Injectable 40 milliGRAM(s) SubCutaneous every 24 hours  furosemide   Injectable 40 milliGRAM(s) IV Push every 12 hours  levothyroxine 100 MICROGram(s) Oral at bedtime  methylPREDNISolone sodium succinate Injectable 60 milliGRAM(s) IV Push three times a day  metoprolol tartrate 25 milliGRAM(s) Oral every 12 hours  pantoprazole  Injectable 40 milliGRAM(s) IV Push every 24 hours  valsartan 320 milliGRAM(s) Oral at bedtime    MEDICATIONS  (PRN):

## 2023-08-18 NOTE — CONSULT NOTE ADULT - ATTENDING COMMENTS
Patient seen, case d/w CCU team.  Agree with assessment and plan.  Uncontrolled HTN with evidence of significant diastolic dysfunction.  PVCs.  Would replace Metoprolol with Carvedilol 6.25 mg bid.  Continue Valsartan, would try to replace Clonidine with diuretic as outpatient.  CCTA as outpatient (already approved by her insurance).  F/u with her cardiologist Dr. Thurman.

## 2023-08-18 NOTE — CONSULT NOTE ADULT - ASSESSMENT
HPI:  Case of 53-year-old female with PMH of HTN, Asthma/COPD (never intubated), GERD, multiple allergies (with history of anaphylactic shock as per the patient), and hypothyroidism presenting for shortness of breath.    History goes back to one day ago when patient ate multiple things with her nephews around 6pm, she had some mild shortness of breath, took her ventolin and went to sleep. Today at 11am, she woke up with shortness of breath and dizziness. She has multiple history of allergies so she thought she was having an allergic reaction, took a shot of epi and called EMS.    When EMS arrived, patient was found to be hypertensive (systolic BP >220), hypoxic (SpO2 in the 70-80s), and tachypneic; started on CPAP.    On ROS, patient denies fevers, chest pain, nausea, vomiting, abdominal pain, tongue swelling, diarrhea, or urinary symptoms. No sick contacts.    Of note, patient says she saw a pulmonologist at the [facility] who diagnosed her with asthma/copd. I called the pharmacy, patient was supposed to be on Breo but never picked up because insurance didnt cover. She is also on torsemide for LLE but no clear cardiac history. She used to follow with an allergist for her allergy history.    In the ED, vitals showed /85 and HR of 130, afebrile  Labs showed WBC of 24k, lactate 10.8, ProBNP 1200  VBG showed pH 7, CO2 74, O2 55, bicarb 20, and lactic 10  Patient started on nitro drip and bipap  Repeat VBG showed pH 7.33, pCO2 56, O2 40, and lactic of 2.  CXR showed bilateral infiltrates    Patient admitted for HTN emergency with possible asthma exacerbation (17 Aug 2023 15:38)      PAST MEDICAL & SURGICAL HISTORY:  Hypothyroid      GERD (gastroesophageal reflux disease)      Colitis      Asthma      No significant past surgical history        (   ) heart valve replacement  (   ) joint replacement  (   ) pregnancy    MEDICATIONS  (STANDING):  albuterol/ipratropium for Nebulization 3 milliLiter(s) Nebulizer every 20 minutes  albuterol/ipratropium for Nebulization 3 milliLiter(s) Nebulizer every 4 hours  azithromycin  IVPB 500 milliGRAM(s) IV Intermittent every 24 hours  cefTRIAXone   IVPB 1000 milliGRAM(s) IV Intermittent every 24 hours  chlorhexidine 2% Cloths 1 Application(s) Topical <User Schedule>  cloNIDine 0.1 milliGRAM(s) Oral every 12 hours  enoxaparin Injectable 40 milliGRAM(s) SubCutaneous every 24 hours  levothyroxine 100 MICROGram(s) Oral daily  methylPREDNISolone sodium succinate Injectable 40 milliGRAM(s) IV Push every 12 hours  metoprolol tartrate 25 milliGRAM(s) Oral every 12 hours  pantoprazole    Tablet 40 milliGRAM(s) Oral before breakfast  torsemide 20 milliGRAM(s) Oral daily  valsartan 320 milliGRAM(s) Oral at bedtime    MEDICATIONS  (PRN):  diphenhydrAMINE 25 milliGRAM(s) Oral every 12 hours PRN Allergy symptoms      Allergies    dairy products (Anaphylaxis)  sulfa drugs (Rash; Urticaria)  shellfish (Unknown)  sulfa drugs (Unknown)  Milk (Anaphylaxis)    Intolerances        FAMILY HISTORY:  No pertinent family history in first degree relatives        *SOCIAL HISTORY: (   ) Tobacco; (   ) ETOH    *Last Dental Visit:    Vital Signs Last 24 Hrs  T(C): 36.5 (18 Aug 2023 12:00), Max: 36.9 (17 Aug 2023 17:30)  T(F): 97.7 (18 Aug 2023 12:00), Max: 98.5 (17 Aug 2023 17:30)  HR: 85 (18 Aug 2023 12:00) (70 - 123)  BP: 139/67 (18 Aug 2023 12:00) (90/51 - 168/65)  BP(mean): 96 (18 Aug 2023 12:00) (67 - 108)  RR: 26 (18 Aug 2023 12:00) (20 - 46)  SpO2: 96% (18 Aug 2023 12:00) (77% - 99%)    Parameters below as of 18 Aug 2023 12:00    O2 Flow (L/min): 3      LABS:                        10.1   10.70 )-----------( 243      ( 18 Aug 2023 05:05 )             34.1     08-18    141  |  103  |  19  ----------------------------<  142<H>  4.0   |  27  |  0.8    Ca    9.1      18 Aug 2023 05:05  Phos  4.0     08-18  Mg     2.2     08-18    TPro  6.1  /  Alb  3.9  /  TBili  0.4  /  DBili  x   /  AST  18  /  ALT  13  /  AlkPhos  72  08-18    WBC Count: 10.70 K/uL [4.80 - 10.80] (08-18 @ 05:05)  Platelet Count - Automated: 243 K/uL [130 - 400] (08-18 @ 05:05)  WBC Count: 24.42 K/uL *H* [4.80 - 10.80] (08-17 @ 12:35)  Platelet Count - Automated: 420 K/uL *H* [130 - 400] (08-17 @ 12:35)    Urinalysis Basic - ( 18 Aug 2023 05:05 )    Color: x / Appearance: x / SG: x / pH: x  Gluc: 142 mg/dL / Ketone: x  / Bili: x / Urobili: x   Blood: x / Protein: x / Nitrite: x   Leuk Esterase: x / RBC: x / WBC x   Sq Epi: x / Non Sq Epi: x / Bacteria: x          EOE:  No facial swelling or asymmetry noted. No trismus    IOE:  no intra oral swelling, tooth #31 fractured on the buccal to the gingival margin with decay. No mobility        *ASSESSMENT: Patient states her tooth was broken a while ago and got symptomatic last night when she was eating. Advised patient to see her dentist to evaluate the tooth and provide appropriate treatment. Patient understands.        RECOMMENDATIONS:  1) take OTC pain medications like tylenol or motrin   2) Dental F/U with outpatient dentist for comprehensive dental care.   3) If any difficulty swallowing/breathing, fever occur, return to ER.     Resident Name, Felicia Lida  pager #[phone]

## 2023-08-18 NOTE — CONSULT NOTE ADULT - CONSULT REASON
Respiratory failure pulm edema
Pulmonary edema  asthma exacerbation
bigeminy
pain on lower right side

## 2023-08-18 NOTE — CONSULT NOTE ADULT - ASSESSMENT
54 yo F w h/o HTN, hypothyroidism, asthma pw hypertensive emergency, flash pulm edema, acute hypoxic resp failure. TTE with G3 diastolic dysfunction. Cardio consulted for ventricular bigeminy.     -Acute on chronic diastolic heart failure v/s flash pulm edema from Hypertensive emergency   -ventricular bigeminy   -HTN   -hypothyroidism , TSH wnl    Recs-  -agree with metoprolol 25 mg q12h , can switch to succinate 50 mg daily tomorrow for bigeminy   -LVEF normal , thus unless she is symptomatic or has a high PVC burden (>10%) does not need further treatment for bigeminy . If she continues to have frequent PVCs despite metoprolol, can see cardiology outpatient for 2 week event monitor to quantify PVC burden.   -continue diuresis with IV lasix 40 mg 12, monitor UOP and keep K >4, mag >2   -valsratan  54 yo F w h/o HTN, hypothyroidism, asthma pw hypertensive emergency, flash pulm edema, acute hypoxic resp failure. TTE with G3 diastolic dysfunction. Cardio consulted for ventricular bigeminy.     -Acute on chronic diastolic heart failure v/s flash pulm edema from Hypertensive emergency    -ventricular bigeminy   -HTN   -hypothyroidism , TSH wnl    Recs-  -agree with metoprolol 25 mg q12h , can switch to succinate 50 mg daily tomorrow for bigeminy   -LVEF normal , thus unless she is symptomatic or has a high PVC burden (>10%) does not need further treatment for bigeminy . If she continues to have frequent PVCs despite metoprolol, can see cardiology outpatient for 2 week event monitor to quantify PVC burden.   -continue diuresis with IV lasix 40 mg 12, monitor UOP and keep K >4, mag >2   -cont home BP meds- valsartan 360 mg

## 2023-08-18 NOTE — CONSULT NOTE ADULT - ASSESSMENT
IMPRESSION:    Acute Hypoxemic respiratory failure  Hypertensive emergency  Asthma exacerbation/Anaphylaxis  HO GERD  Pulmonary edema   Questionable pneumonia    PLAN:      CNS: avoid sedation. MS at baseline.     HEENT: Oral care    PULMONARY:  HOB @ 45 degrees.  Aspiration precautions, BiPAP as needed, Solumedrol 40mg BID60 TID, nebs q 4 hr. Needs to be on maintenance inhalers when stable. Repeat ABG noted with improvement in hypercapnia. Repeat CXR noted. VBG today.     CARDIOVASCULAR: Repeat CE negative. BNP elevated. Lasix 40 mg IV daily. Echocardiogram. BP controlled now. Repeat lactate normalized.      GI: GI prophylaxis. Oral diet while off BIPAP.     RENAL:  Follow up lytes.  Correct as needed. Resume home antihypertensive medications.     INFECTIOUS DISEASE: Follow up cultures, procalcitonin, Rocephin , azithromycin for now. RVP panel. Nasal MRSA.     HEMATOLOGICAL:  DVT prophylaxis. Check duplex lower extremities.     ENDOCRINE:  Follow up FS.  Insulin protocol if needed.    MUSCULOSKELETAL: Out of bed.     Transfer to SDU.    IMPRESSION:    Acute Hypoxemic respiratory failure  Hypertensive emergency  Asthma exacerbation/Anaphylaxis  HO GERD  Pulmonary edema   Questionable pneumonia    PLAN:      CNS: avoid sedation. MS at baseline.     HEENT: Oral care    PULMONARY:  HOB @ 45 degrees.  Aspiration precautions, BiPAP as needed, Solumedrol 40mg BID. nebs q 4 hr. Needs to be on maintenance inhalers when stable. Repeat ABG noted with improvement in hypercapnia. Repeat CXR noted with bilateral inf. VBG today.     CARDIOVASCULAR: Repeat CE negative. BNP elevated. Lasix 40 mg IV once; resume her oral diuretic. Echocardiogram. BP controlled now. Repeat lactate normalized. Metoprolol for the PVC/bigemini.     GI: GI prophylaxis. Oral diet while off BIPAP.     RENAL:  Follow up lytes.  Correct as needed. Resume home antihypertensive medications.     INFECTIOUS DISEASE: Follow up cultures, procalcitonin, Rocephin , azithromycin for now. RVP panel. Nasal MRSA.     HEMATOLOGICAL:  DVT prophylaxis. Check duplex lower extremities. D-dimer noted and elevated.    ENDOCRINE:  Follow up FS.  Insulin protocol if needed.    MUSCULOSKELETAL: Out of bed.     Transfer to SDU.

## 2023-08-19 LAB
ALBUMIN SERPL ELPH-MCNC: 4.3 G/DL — SIGNIFICANT CHANGE UP (ref 3.5–5.2)
ALP SERPL-CCNC: 81 U/L — SIGNIFICANT CHANGE UP (ref 30–115)
ALT FLD-CCNC: 14 U/L — SIGNIFICANT CHANGE UP (ref 0–41)
ANION GAP SERPL CALC-SCNC: 12 MMOL/L — SIGNIFICANT CHANGE UP (ref 7–14)
AST SERPL-CCNC: 16 U/L — SIGNIFICANT CHANGE UP (ref 0–41)
BILIRUB SERPL-MCNC: 0.4 MG/DL — SIGNIFICANT CHANGE UP (ref 0.2–1.2)
BUN SERPL-MCNC: 27 MG/DL — HIGH (ref 10–20)
CALCIUM SERPL-MCNC: 9.5 MG/DL — SIGNIFICANT CHANGE UP (ref 8.4–10.4)
CHLORIDE SERPL-SCNC: 96 MMOL/L — LOW (ref 98–110)
CO2 SERPL-SCNC: 30 MMOL/L — SIGNIFICANT CHANGE UP (ref 17–32)
CREAT SERPL-MCNC: 1 MG/DL — SIGNIFICANT CHANGE UP (ref 0.7–1.5)
EGFR: 67 ML/MIN/1.73M2 — SIGNIFICANT CHANGE UP
GLUCOSE SERPL-MCNC: 113 MG/DL — HIGH (ref 70–99)
HCT VFR BLD CALC: 38.4 % — SIGNIFICANT CHANGE UP (ref 37–47)
HGB BLD-MCNC: 11.1 G/DL — LOW (ref 12–16)
LEGIONELLA AG UR QL: NEGATIVE — SIGNIFICANT CHANGE UP
MAGNESIUM SERPL-MCNC: 2.1 MG/DL — SIGNIFICANT CHANGE UP (ref 1.8–2.4)
MCHC RBC-ENTMCNC: 22.6 PG — LOW (ref 27–31)
MCHC RBC-ENTMCNC: 28.9 G/DL — LOW (ref 32–37)
MCV RBC AUTO: 78.2 FL — LOW (ref 81–99)
NRBC # BLD: 0 /100 WBCS — SIGNIFICANT CHANGE UP (ref 0–0)
PLATELET # BLD AUTO: 319 K/UL — SIGNIFICANT CHANGE UP (ref 130–400)
PMV BLD: 11.5 FL — HIGH (ref 7.4–10.4)
POTASSIUM SERPL-MCNC: 3.7 MMOL/L — SIGNIFICANT CHANGE UP (ref 3.5–5)
POTASSIUM SERPL-SCNC: 3.7 MMOL/L — SIGNIFICANT CHANGE UP (ref 3.5–5)
PROT ?TM UR-MCNC: 52 MG/DL — HIGH (ref 0–12)
PROT ?TM UR-MCNC: 52 MG/DL — HIGH (ref 0–12)
PROT SERPL-MCNC: 6 G/DL — SIGNIFICANT CHANGE UP (ref 6–8.3)
PROT SERPL-MCNC: 6 G/DL — SIGNIFICANT CHANGE UP (ref 6–8.3)
PROT SERPL-MCNC: 7 G/DL — SIGNIFICANT CHANGE UP (ref 6–8)
RAPID RVP RESULT: SIGNIFICANT CHANGE UP
RBC # BLD: 4.91 M/UL — SIGNIFICANT CHANGE UP (ref 4.2–5.4)
RBC # FLD: 16.9 % — HIGH (ref 11.5–14.5)
SARS-COV-2 RNA SPEC QL NAA+PROBE: SIGNIFICANT CHANGE UP
SODIUM SERPL-SCNC: 138 MMOL/L — SIGNIFICANT CHANGE UP (ref 135–146)
WBC # BLD: 15.72 K/UL — HIGH (ref 4.8–10.8)
WBC # FLD AUTO: 15.72 K/UL — HIGH (ref 4.8–10.8)

## 2023-08-19 PROCEDURE — 99233 SBSQ HOSP IP/OBS HIGH 50: CPT

## 2023-08-19 PROCEDURE — 99232 SBSQ HOSP IP/OBS MODERATE 35: CPT

## 2023-08-19 PROCEDURE — 71045 X-RAY EXAM CHEST 1 VIEW: CPT | Mod: 26

## 2023-08-19 RX ORDER — KETOROLAC TROMETHAMINE 30 MG/ML
15 SYRINGE (ML) INJECTION ONCE
Refills: 0 | Status: COMPLETED | OUTPATIENT
Start: 2023-08-19 | End: 2023-08-19

## 2023-08-19 RX ORDER — ACETAMINOPHEN 500 MG
650 TABLET ORAL EVERY 6 HOURS
Refills: 0 | Status: DISCONTINUED | OUTPATIENT
Start: 2023-08-19 | End: 2023-08-20

## 2023-08-19 RX ORDER — TRAMADOL HYDROCHLORIDE 50 MG/1
50 TABLET ORAL ONCE
Refills: 0 | Status: DISCONTINUED | OUTPATIENT
Start: 2023-08-19 | End: 2023-08-19

## 2023-08-19 RX ADMIN — Medication 650 MILLIGRAM(S): at 22:29

## 2023-08-19 RX ADMIN — CARVEDILOL PHOSPHATE 6.25 MILLIGRAM(S): 80 CAPSULE, EXTENDED RELEASE ORAL at 05:49

## 2023-08-19 RX ADMIN — AZITHROMYCIN 255 MILLIGRAM(S): 500 TABLET, FILM COATED ORAL at 14:16

## 2023-08-19 RX ADMIN — Medication 650 MILLIGRAM(S): at 23:30

## 2023-08-19 RX ADMIN — CHLORHEXIDINE GLUCONATE 1 APPLICATION(S): 213 SOLUTION TOPICAL at 05:49

## 2023-08-19 RX ADMIN — PANTOPRAZOLE SODIUM 40 MILLIGRAM(S): 20 TABLET, DELAYED RELEASE ORAL at 07:01

## 2023-08-19 RX ADMIN — Medication 0.1 MILLIGRAM(S): at 05:48

## 2023-08-19 RX ADMIN — Medication 20 MILLIGRAM(S): at 05:49

## 2023-08-19 RX ADMIN — CARVEDILOL PHOSPHATE 6.25 MILLIGRAM(S): 80 CAPSULE, EXTENDED RELEASE ORAL at 17:39

## 2023-08-19 RX ADMIN — CEFTRIAXONE 100 MILLIGRAM(S): 500 INJECTION, POWDER, FOR SOLUTION INTRAMUSCULAR; INTRAVENOUS at 17:43

## 2023-08-19 RX ADMIN — ENOXAPARIN SODIUM 40 MILLIGRAM(S): 100 INJECTION SUBCUTANEOUS at 17:42

## 2023-08-19 RX ADMIN — VALSARTAN 320 MILLIGRAM(S): 80 TABLET ORAL at 22:29

## 2023-08-19 RX ADMIN — Medication 100 MICROGRAM(S): at 05:48

## 2023-08-19 RX ADMIN — Medication 0.1 MILLIGRAM(S): at 17:39

## 2023-08-19 RX ADMIN — Medication 40 MILLIGRAM(S): at 14:14

## 2023-08-19 RX ADMIN — Medication 40 MILLIGRAM(S): at 05:49

## 2023-08-19 NOTE — CHART NOTE - NSCHARTNOTEFT_GEN_A_CORE
Transfer Note :    Transfer from CCU   to    Floor       Handoff:  -f/u cultures , Procal ,MRSA   -f/u duplex   -f/u repeat labs           HPI :  Case of 53-year-old female with PMH of HTN, Asthma/COPD (never intubated), GERD, multiple allergies (with history of anaphylactic shock as per the patient), and hypothyroidism presenting for shortness of breath.    History goes back to one day ago when patient ate multiple things with her nephews around 6pm, she had some mild shortness of breath, took her ventolin and went to sleep. Today at 11am, she woke up with shortness of breath and dizziness. She has multiple history of allergies so she thought she was having an allergic reaction, took a shot of epi and called EMS.    Of note, patient says she saw a pulmonologist at the [facility] who diagnosed her with asthma/copd. I called the pharmacy, patient was supposed to be on Breo but never picked up because insurance didn't cover. She is also on torsemide for LLE but no clear cardiac history. She used to follow with an allergist for her allergy history      ED Course :  In the ED, vitals showed /85 and HR of 130, afebrile  Labs showed WBC of 24k, lactate 10.8, ProBNP 1200  VBG showed pH 7, CO2 74, O2 55, bicarb 20, and lactic 10  Patient started on nitro drip and bipap  Repeat VBG showed pH 7.33, pCO2 56, O2 40, and lactic of 2.  CXR showed bilateral infiltrates    Patient admitted for HTN emergency with possible asthma exacerbation        CCU Course :   Patient came to CCU on 8/17.  Pt was on  Aspiration precautions, BiPAP as needed, Solumedrol 40mg BID. nebs q 4 hr.   Repeated ABG was with improvement in hypercapnia. Repeat CXR for b/l infiltrates was noted.   Repeated CE was negative. BNP elevated. Lasix 40 mg IV once was given ; resumed her oral diuretic.   Echocardiogram  showed diastolic dysfunction of grade 3 with EF of 55. . BP was  controlled on antihypertensives. Repeat lactate was normal. Metoprolol for the   PVC/bigemini was given.  ON Rocephin 1g IVPB and azithromycin 500mg IVBP.  Elevated D-dimers noted  and ordered Duplex LE.  RVP came back negative   Continue clonidine 0.1mg q12  IV Solu-Medrol D/C 8/19 and switched to PO Prednisone 40mg one a day for 3 days then 20mg PO once a day for 3 days, has received first dose 8/19           IMPRESSION:    Acute Hypoxemic respiratory failure  Hypertensive emergency  Asthma exacerbation/Anaphylaxis  HO GERD  Pulmonary edema   Questionable pneumonia    PLAN:      CNS: avoid sedation. MS at baseline.     HEENT: Oral care    PULMONARY:  HOB @ 45 degrees.  Aspiration precautions, BiPAP as needed, Solumedrol 40mg BID. nebs q 4 hr. Needs to be on maintenance inhalers when stable. Repeat ABG noted with improvement in hypercapnia. Repeat CXR noted with bilateral inf. VBG today.     CARDIOVASCULAR: Repeat CE negative. BNP elevated. Lasix 40 mg IV once; resume her oral diuretic. Echocardiogram. BP controlled now. Repeat lactate normalized. Metoprolol for the PVC/bigemini.     GI: GI prophylaxis. Oral diet while off BIPAP.     RENAL:  Follow up lytes.  Correct as needed. Resume home antihypertensive medications.     INFECTIOUS DISEASE: Follow up cultures, procalcitonin, Rocephin , azithromycin for now. RVP panel. Nasal MRSA.     HEMATOLOGICAL:  DVT prophylaxis. Check duplex lower extremities. D-dimer noted and elevated.    ENDOCRINE:  Follow up FS.  Insulin protocol if needed.    MUSCULOSKELETAL: Out of bed.     Transfer to Tele

## 2023-08-20 ENCOUNTER — TRANSCRIPTION ENCOUNTER (OUTPATIENT)
Age: 53
End: 2023-08-20

## 2023-08-20 VITALS
HEART RATE: 72 BPM | RESPIRATION RATE: 18 BRPM | SYSTOLIC BLOOD PRESSURE: 109 MMHG | OXYGEN SATURATION: 98 % | DIASTOLIC BLOOD PRESSURE: 53 MMHG | TEMPERATURE: 97 F

## 2023-08-20 DIAGNOSIS — J96.01 ACUTE RESPIRATORY FAILURE WITH HYPOXIA: ICD-10-CM

## 2023-08-20 DIAGNOSIS — E03.9 HYPOTHYROIDISM, UNSPECIFIED: ICD-10-CM

## 2023-08-20 DIAGNOSIS — I10 ESSENTIAL (PRIMARY) HYPERTENSION: ICD-10-CM

## 2023-08-20 DIAGNOSIS — Z79.899 OTHER LONG TERM (CURRENT) DRUG THERAPY: ICD-10-CM

## 2023-08-20 DIAGNOSIS — J45.909 UNSPECIFIED ASTHMA, UNCOMPLICATED: ICD-10-CM

## 2023-08-20 LAB
ALBUMIN SERPL ELPH-MCNC: 3.6 G/DL — SIGNIFICANT CHANGE UP (ref 3.5–5.2)
ALP SERPL-CCNC: 69 U/L — SIGNIFICANT CHANGE UP (ref 30–115)
ALT FLD-CCNC: 12 U/L — SIGNIFICANT CHANGE UP (ref 0–41)
ANION GAP SERPL CALC-SCNC: 8 MMOL/L — SIGNIFICANT CHANGE UP (ref 7–14)
AST SERPL-CCNC: 12 U/L — SIGNIFICANT CHANGE UP (ref 0–41)
BILIRUB SERPL-MCNC: 0.3 MG/DL — SIGNIFICANT CHANGE UP (ref 0.2–1.2)
BUN SERPL-MCNC: 31 MG/DL — HIGH (ref 10–20)
CALCIUM SERPL-MCNC: 9.3 MG/DL — SIGNIFICANT CHANGE UP (ref 8.4–10.4)
CHLORIDE SERPL-SCNC: 100 MMOL/L — SIGNIFICANT CHANGE UP (ref 98–110)
CO2 SERPL-SCNC: 34 MMOL/L — HIGH (ref 17–32)
CREAT SERPL-MCNC: 1 MG/DL — SIGNIFICANT CHANGE UP (ref 0.7–1.5)
EGFR: 67 ML/MIN/1.73M2 — SIGNIFICANT CHANGE UP
GLUCOSE SERPL-MCNC: 112 MG/DL — HIGH (ref 70–99)
HCT VFR BLD CALC: 34.1 % — LOW (ref 37–47)
HGB BLD-MCNC: 9.9 G/DL — LOW (ref 12–16)
MAGNESIUM SERPL-MCNC: 2.2 MG/DL — SIGNIFICANT CHANGE UP (ref 1.8–2.4)
MCHC RBC-ENTMCNC: 22.6 PG — LOW (ref 27–31)
MCHC RBC-ENTMCNC: 29 G/DL — LOW (ref 32–37)
MCV RBC AUTO: 77.9 FL — LOW (ref 81–99)
NRBC # BLD: 0 /100 WBCS — SIGNIFICANT CHANGE UP (ref 0–0)
PLATELET # BLD AUTO: 255 K/UL — SIGNIFICANT CHANGE UP (ref 130–400)
PMV BLD: 11.8 FL — HIGH (ref 7.4–10.4)
POTASSIUM SERPL-MCNC: 4 MMOL/L — SIGNIFICANT CHANGE UP (ref 3.5–5)
POTASSIUM SERPL-SCNC: 4 MMOL/L — SIGNIFICANT CHANGE UP (ref 3.5–5)
PROT SERPL-MCNC: 6 G/DL — SIGNIFICANT CHANGE UP (ref 6–8)
RBC # BLD: 4.38 M/UL — SIGNIFICANT CHANGE UP (ref 4.2–5.4)
RBC # FLD: 16.6 % — HIGH (ref 11.5–14.5)
SODIUM SERPL-SCNC: 142 MMOL/L — SIGNIFICANT CHANGE UP (ref 135–146)
WBC # BLD: 15.71 K/UL — HIGH (ref 4.8–10.8)
WBC # FLD AUTO: 15.71 K/UL — HIGH (ref 4.8–10.8)

## 2023-08-20 PROCEDURE — 99233 SBSQ HOSP IP/OBS HIGH 50: CPT

## 2023-08-20 PROCEDURE — 99239 HOSP IP/OBS DSCHRG MGMT >30: CPT

## 2023-08-20 PROCEDURE — 71045 X-RAY EXAM CHEST 1 VIEW: CPT | Mod: 26

## 2023-08-20 RX ORDER — IPRATROPIUM/ALBUTEROL SULFATE 18-103MCG
3 AEROSOL WITH ADAPTER (GRAM) INHALATION
Qty: 0 | Refills: 0 | DISCHARGE
Start: 2023-08-20

## 2023-08-20 RX ORDER — CARVEDILOL PHOSPHATE 80 MG/1
1 CAPSULE, EXTENDED RELEASE ORAL
Qty: 60 | Refills: 0
Start: 2023-08-20 | End: 2023-09-18

## 2023-08-20 RX ADMIN — CHLORHEXIDINE GLUCONATE 1 APPLICATION(S): 213 SOLUTION TOPICAL at 05:53

## 2023-08-20 RX ADMIN — Medication 0.1 MILLIGRAM(S): at 05:53

## 2023-08-20 RX ADMIN — Medication 20 MILLIGRAM(S): at 05:54

## 2023-08-20 RX ADMIN — CARVEDILOL PHOSPHATE 6.25 MILLIGRAM(S): 80 CAPSULE, EXTENDED RELEASE ORAL at 05:53

## 2023-08-20 RX ADMIN — Medication 10 MILLILITER(S): at 14:20

## 2023-08-20 RX ADMIN — Medication 100 MICROGRAM(S): at 05:53

## 2023-08-20 RX ADMIN — CARVEDILOL PHOSPHATE 6.25 MILLIGRAM(S): 80 CAPSULE, EXTENDED RELEASE ORAL at 17:47

## 2023-08-20 RX ADMIN — Medication 40 MILLIGRAM(S): at 06:09

## 2023-08-20 RX ADMIN — PANTOPRAZOLE SODIUM 40 MILLIGRAM(S): 20 TABLET, DELAYED RELEASE ORAL at 06:09

## 2023-08-20 NOTE — DISCHARGE NOTE PROVIDER - NSFOLLOWUPCLINICS_GEN_ALL_ED_FT
[facility] Medicine [facility]  Medicine  [address], [place]   Phone: [phone]  Fax:   Follow Up Time: 1 week    [facility] Dental [facility]  Dental  [address] [place] [address]  Phone: [phone]  Fax:   Follow Up Time: 1 week

## 2023-08-20 NOTE — DISCHARGE NOTE NURSING/CASE MANAGEMENT/SOCIAL WORK - PATIENT PORTAL LINK FT
You can access the FollowMyHealth Patient Portal offered by [facility] by registering at the following website: [email or web address]. By joining [employer]’s FollowMyHealth portal, you will also be able to view your health information using other applications (apps) compatible with our system.

## 2023-08-20 NOTE — DISCHARGE NOTE PROVIDER - NSDCMRMEDTOKEN_GEN_ALL_CORE_FT
carvedilol 6.25 mg oral tablet: 1 tab(s) orally 2 times a day  cloNIDine 0.1 mg oral tablet: 1 tab(s) orally once a day  dicyclomine 20 mg oral tablet: 1 tab(s) orally 2 times a day  EpiPen Auto-Injector 0.3 mg injectable kit: 0.3 milligram(s) intramuscularly once   ipratropium-albuterol 0.5 mg-2.5 mg/3 mL inhalation solution: 3 milliliter(s) inhaled every 20 minutes  ipratropium-albuterol 0.5 mg-2.5 mg/3 mL inhalation solution: 3 milliliter(s) inhaled every 4 hours  levothyroxine 100 mcg (0.1 mg) oral tablet: 1 tab(s) orally once a day  omeprazole 40 mg oral delayed release capsule: 1 tab(s) orally once a day  predniSONE 20 mg oral tablet: 2 tab(s) orally once a day Take 2 tablets on Monday, take 1 tablet on Tuesday, take 1 tablet on Wednesday, taken 1 tablet on Thursday; Then STOP  torsemide 20 mg oral tablet: 1 tab(s) orally once a day  valsartan-hydrochlorothiazide 320 mg-25 mg oral tablet: 1 tab(s) orally once a day  Ventolin HFA 90 mcg/inh inhalation aerosol: 2 puff(s) inhaled 4 times a day

## 2023-08-20 NOTE — PROGRESS NOTE ADULT - SUBJECTIVE AND OBJECTIVE BOX
Subjective:  Dr Jennings after doing the consult informed me about her admission, even though she had mentioned several times to call me but service team was called to see her.  Feels well, no cardiac complaint, just isolated PVCs, no cp, sob, palpitation, edema, no remnant of allergy  she is on 2 antibiotics? for possible PNA, but she has no symptoms and chest xray is OK  HOME MEDs: Valsartan hctz, Torsemide 20, Clonidine 0.1, Atenolol, could not tolerate Ca blockers,     MEDICATIONS  (STANDING):  albuterol/ipratropium for Nebulization 3 milliLiter(s) Nebulizer every 4 hours  albuterol/ipratropium for Nebulization 3 milliLiter(s) Nebulizer every 20 minutes  azithromycin  IVPB 500 milliGRAM(s) IV Intermittent every 24 hours  carvedilol 6.25 milliGRAM(s) Oral every 12 hours  cefTRIAXone   IVPB 1000 milliGRAM(s) IV Intermittent every 24 hours  chlorhexidine 2% Cloths 1 Application(s) Topical <User Schedule>  cloNIDine 0.1 milliGRAM(s) Oral every 12 hours  enoxaparin Injectable 40 milliGRAM(s) SubCutaneous every 24 hours  levothyroxine 100 MICROGram(s) Oral daily  pantoprazole    Tablet 40 milliGRAM(s) Oral before breakfast  predniSONE   Tablet 40 milliGRAM(s) Oral once  torsemide 20 milliGRAM(s) Oral daily  valsartan 320 milliGRAM(s) Oral at bedtime    MEDICATIONS  (PRN):  diphenhydrAMINE 25 milliGRAM(s) Oral every 12 hours PRN Allergy symptoms            Vital Signs Last 24 Hrs  T(C): 36.1 (19 Aug 2023 08:00), Max: 36.7 (19 Aug 2023 04:00)  T(F): 96.9 (19 Aug 2023 08:00), Max: 98 (19 Aug 2023 04:00)  HR: 70 (19 Aug 2023 11:00) (63 - 112)  BP: 128/56 (19 Aug 2023 11:00) (93/42 - 139/67)  BP(mean): 84 (19 Aug 2023 11:00) (60 - 96)  RR: 22 (19 Aug 2023 11:00) (19 - 34)  SpO2: 96% (19 Aug 2023 11:00) (90% - 99%)    Parameters below as of 19 Aug 2023 11:00  Patient On (Oxygen Delivery Method): room air                 REVIEW OF SYSTEMS:  CONSTITUTIONAL: no fever, no chills, no diaphoresis  CARDIOLOGY: no chest pain, no SOB, no palpitation,   RESPIRATORY: no dyspnea, no wheeze, no orthopnea, no PND   NEUROLOGICAL: no dizziness, headache  GI: no abdominal pain, no dyspepsia, no nausea, no vomiting, no diarrhea.    SKIN: no ecchymosis, no petechia             PHYSICAL EXAM:  · CONSTITUTIONAL: Looks stable  . NECK: Supple, no JVD, no bruit    · RESPIRATORY: Normal air entry to lung base, no wheeze, no crackle, no wet rales  · CARDIOVASCULAR: Normal S1, A2, P2, apical and 2nd ICS 1/6 systolic ejection murmur, regular rate  · EXTREMITIES: No edema  · VASCULAR: Pulses are regular, equal, bilateral in upper and lower extremities  	  TELEMETRY: NSR, PVCs isolated    ECG: < from: 12 Lead ECG (08.17.23 @ 12:33) >  Diagnosis Line Sinus tachycardia with occasional Premature ventricular complexes  Nonspecific ST abnormality  Abnormal ECG    < end of copied text >      TTE: < from: TTE Echo Complete w/ Contrast w/ Doppler (08.18.23 @ 08:52) >   1. Normal global left ventricular systolic function.   2. LV Ejection Fraction by Arana's Method with a biplane EF of 55 %.   3. Mild concentric left ventricular hypertrophy.   4. Normal right ventricular size and function.   5. Mild mitral valve regurgitation.   6. Elevated mean left atrial pressure.   7. Spectral Doppler shows restrictive pattern of left ventricular   myocardial filling (Grade III diastolic dysfunction).   8. Adequate TR velocity was not obtained to accurately assess RVSP.   9. Endocardial visualization was enhanced with intravenous echo contrast.    < end of copied text >      LABS:                        11.1   15.72 )-----------( 319      ( 19 Aug 2023 05:23 )             38.4     08-19    138  |  96<L>  |  27<H>  ----------------------------<  113<H>  3.7   |  30  |  1.0    Ca    9.5      19 Aug 2023 05:23  Phos  4.0     08-18  Mg     2.1     08-19    TPro  7.0  /  Alb  4.3  /  TBili  0.4  /  DBili  x   /  AST  16  /  ALT  14  /  AlkPhos  81  08-19    CARDIAC MARKERS ( 18 Aug 2023 05:05 )  x     / <0.01 ng/mL / x     / x     / x      CARDIAC MARKERS ( 17 Aug 2023 20:18 )  x     / 0.06 ng/mL / x     / x     / x      CARDIAC MARKERS ( 17 Aug 2023 12:35 )  x     / <0.01 ng/mL / x     / x     / x              I&O's Summary    18 Aug 2023 07:01  -  19 Aug 2023 07:00  --------------------------------------------------------  IN: 1400 mL / OUT: 1700 mL / NET: -300 mL    19 Aug 2023 07:01  -  19 Aug 2023 11:22  --------------------------------------------------------  IN: 420 mL / OUT: 0 mL / NET: 420 mL      BNP  RADIOLOGY & ADDITIONAL STUDIES:    IMPRESSION AND PLAN: < from: Xray Chest 1 View- PORTABLE-Routine (Xray Chest 1 View- PORTABLE-Routine in AM.) (08.18.23 @ 07:21) >  Stablebilateral lung opacities    Heart and mediastinum are obscured and limited in evaluation      < end of copied text >          
*IM ATTENDING NOTE*      ANIYAH WATSON  53y  Female      Patient is a 53y old  Female who presents with a chief complaint of     INTERVAL HPI/OVERNIGHT EVENTS:  Pt feels well today. She denied any complaints. She was sitting in chair, having lunch.     Vital Signs Last 24 Hrs  T(C): 36.1 (19 Aug 2023 08:00), Max: 36.7 (19 Aug 2023 04:00)  T(F): 96.9 (19 Aug 2023 08:00), Max: 98 (19 Aug 2023 04:00)  HR: 70 (19 Aug 2023 11:00) (63 - 112)  BP: 128/56 (19 Aug 2023 11:00) (93/42 - 138/63)  BP(mean): 84 (19 Aug 2023 11:00) (60 - 91)  RR: 22 (19 Aug 2023 11:00) (19 - 34)  SpO2: 96% (19 Aug 2023 11:00) (90% - 99%)    Parameters below as of 19 Aug 2023 11:00  Patient On (Oxygen Delivery Method): room air        08-18-23 @ 07:01  -  08-19-23 @ 07:00  --------------------------------------------------------  IN: 1400 mL / OUT: 1700 mL / NET: -300 mL    08-19-23 @ 07:01  - 08-19-23 @ 12:24  --------------------------------------------------------  IN: 420 mL / OUT: 0 mL / NET: 420 mL      PHYSICAL EXAM:  GEN: No acute distress  LUNGS: Clear to auscultation bilaterally   HEART: S1/S2 present. RRR.   ABD/ GI: Soft, non-tender, non-distended. Bowel sounds present  EXT: NC/NC/NE/2+PP/MATIAS  NEURO: AAOX3    Consultant(s) Notes Reviewed:  [x ] YES  [ ] NO    LABS                          11.1   15.72 )-----------( 319      ( 19 Aug 2023 05:23 )             38.4     08-19    138  |  96<L>  |  27<H>  ----------------------------<  113<H>  3.7   |  30  |  1.0    Ca    9.5      19 Aug 2023 05:23  Phos  4.0     08-18  Mg     2.1     08-19    TPro  7.0  /  Alb  4.3  /  TBili  0.4  /  DBili  x   /  AST  16  /  ALT  14  /  AlkPhos  81  08-19      Urinalysis Basic - ( 19 Aug 2023 05:23 )  Color: x / Appearance: x / SG: x / pH: x  Gluc: 113 mg/dL / Ketone: x  / Bili: x / Urobili: x   Blood: x / Protein: x / Nitrite: x   Leuk Esterase: x / RBC: x / WBC x   Sq Epi: x / Non Sq Epi: x / Bacteria: x      Lactate Trend  08-18 @ 05:05 Lactate:1.4   08-17 @ 12:35 Lactate:10.8    CARDIAC MARKERS ( 18 Aug 2023 05:05 )  x     / <0.01 ng/mL / x     / x     / x      CARDIAC MARKERS ( 17 Aug 2023 20:18 )  x     / 0.06 ng/mL / x     / x     / x      CARDIAC MARKERS ( 17 Aug 2023 12:35 )  x     / <0.01 ng/mL / x     / x     / x          POCT Blood Glucose.: 83 mg/dL (17 Aug 2023 17:38)      
Patient is a 53y old  Female who presents with a chief complaint of       Over Night Events:  OOB to chair.  On RA.  Off pressors.          ROS:     All ROS are negative except HPI         PHYSICAL EXAM    ICU Vital Signs Last 24 Hrs  T(C): 35.6 (20 Aug 2023 12:00), Max: 36.4 (19 Aug 2023 21:30)  T(F): 96 (20 Aug 2023 12:00), Max: 97.6 (19 Aug 2023 21:30)  HR: 62 (20 Aug 2023 12:00) (54 - 104)  BP: 131/61 (20 Aug 2023 12:00) (113/60 - 143/84)  BP(mean): 88 (20 Aug 2023 12:00) (78 - 102)  ABP: --  ABP(mean): --  RR: 18 (20 Aug 2023 12:00) (18 - 24)  SpO2: 99% (20 Aug 2023 12:00) (96% - 99%)    O2 Parameters below as of 20 Aug 2023 12:00  Patient On (Oxygen Delivery Method): room air            CONSTITUTIONAL:  Well nourished.  NAD    ENT:   Airway patent,   Mouth with normal mucosa.       EYES:   Pupils equal,   Round and reactive to light.    CARDIAC:   Normal rate,   Regular rhythm.        RESPIRATORY:   No wheezing  Bilateral BS  Normal chest expansion  Not tachypneic,  No use of accessory muscles    GASTROINTESTINAL:  Abdomen soft,   Non-tender,   No guarding,   + BS    MUSCULOSKELETAL:   Range of motion is not limited,  No clubbing, cyanosis    NEUROLOGICAL:   Alert and oriented   No motor  deficits.    SKIN:   Skin normal color for race,   No evidence of rash.            08-19-23 @ 07:01  -  08-20-23 @ 07:00  --------------------------------------------------------  IN:    IV PiggyBack: 300 mL    Oral Fluid: 1480 mL  Total IN: 1780 mL    OUT:  Total OUT: 0 mL    Total NET: 1780 mL          LABS:                            9.9    15.71 )-----------( 255      ( 20 Aug 2023 04:19 )             34.1                                               08-20    142  |  100  |  31<H>  ----------------------------<  112<H>  4.0   |  34<H>  |  1.0    Ca    9.3      20 Aug 2023 04:19  Mg     2.2     08-20    TPro  6.0  /  Alb  3.6  /  TBili  0.3  /  DBili  x   /  AST  12  /  ALT  12  /  AlkPhos  69  08-20                                             Urinalysis Basic - ( 20 Aug 2023 04:19 )    Color: x / Appearance: x / SG: x / pH: x  Gluc: 112 mg/dL / Ketone: x  / Bili: x / Urobili: x   Blood: x / Protein: x / Nitrite: x   Leuk Esterase: x / RBC: x / WBC x   Sq Epi: x / Non Sq Epi: x / Bacteria: x                                                  LIVER FUNCTIONS - ( 20 Aug 2023 04:19 )  Alb: 3.6 g/dL / Pro: 6.0 g/dL / ALK PHOS: 69 U/L / ALT: 12 U/L / AST: 12 U/L / GGT: x                                                  Culture - Blood (collected 17 Aug 2023 20:18)  Source: .Blood None  Preliminary Report (20 Aug 2023 02:02):    No growth at 48 Hours                                                                                           MEDICATIONS  (STANDING):  albuterol/ipratropium for Nebulization 3 milliLiter(s) Nebulizer every 4 hours  albuterol/ipratropium for Nebulization 3 milliLiter(s) Nebulizer every 20 minutes  carvedilol 6.25 milliGRAM(s) Oral every 12 hours  chlorhexidine 2% Cloths 1 Application(s) Topical <User Schedule>  cloNIDine 0.1 milliGRAM(s) Oral every 12 hours  enoxaparin Injectable 40 milliGRAM(s) SubCutaneous every 24 hours  levothyroxine 100 MICROGram(s) Oral daily  pantoprazole    Tablet 40 milliGRAM(s) Oral before breakfast  torsemide 20 milliGRAM(s) Oral daily  valsartan 320 milliGRAM(s) Oral at bedtime    MEDICATIONS  (PRN):  acetaminophen     Tablet .. 650 milliGRAM(s) Oral every 6 hours PRN Temp greater or equal to 38C (100.4F), Mild Pain (1 - 3)  diphenhydrAMINE 25 milliGRAM(s) Oral every 12 hours PRN Allergy symptoms      New X-rays reviewed:                                                                                  ECHO        
Patient is a 53y old  Female who presents with a chief complaint of     Over Night Events:  Patient seen and examined.   awake sitting in chair eating on room air     ROS:  See HPI    PHYSICAL EXAM    ICU Vital Signs Last 24 Hrs  T(C): 36.7 (19 Aug 2023 04:00), Max: 36.7 (19 Aug 2023 04:00)  T(F): 98 (19 Aug 2023 04:00), Max: 98 (19 Aug 2023 04:00)  HR: 76 (19 Aug 2023 06:00) (63 - 112)  BP: 136/63 (19 Aug 2023 06:00) (93/42 - 139/67)  BP(mean): 90 (19 Aug 2023 06:00) (60 - 96)  ABP: --  ABP(mean): --  RR: 20 (19 Aug 2023 06:00) (19 - 38)  SpO2: 94% (19 Aug 2023 06:00) (88% - 99%)    O2 Parameters below as of 19 Aug 2023 04:00  Patient On (Oxygen Delivery Method): room air          a  General:awake   HEENT:   kris             Lymph Nodes: NO cervical LN   Lungs: Bilateral BS  Cardiovascular: Regular   Abdomen: Soft, Positive BS  Extremities: No clubbing   Skin: warm   Neurological: no focal   Musculoskeletal: move all ext     I&O's Detail    18 Aug 2023 07:01  -  19 Aug 2023 07:00  --------------------------------------------------------  IN:    IV PiggyBack: 300 mL    Oral Fluid: 1100 mL  Total IN: 1400 mL    OUT:    Voided (mL): 1700 mL  Total OUT: 1700 mL    Total NET: -300 mL          LABS:                          11.1   15.72 )-----------( 319      ( 19 Aug 2023 05:23 )             38.4         19 Aug 2023 05:23    138    |  96     |  27     ----------------------------<  113    3.7     |  30     |  1.0      Ca    9.5        19 Aug 2023 05:23  Phos  4.0       18 Aug 2023 05:05  Mg     2.1       19 Aug 2023 05:23    TPro  7.0    /  Alb  4.3    /  TBili  0.4    /  DBili  x      /  AST  16     /  ALT  14     /  AlkPhos  81     19 Aug 2023 05:23  Amylase x     lipase x                                                                                        Urinalysis Basic - ( 19 Aug 2023 05:23 )    Color: x / Appearance: x / SG: x / pH: x  Gluc: 113 mg/dL / Ketone: x  / Bili: x / Urobili: x   Blood: x / Protein: x / Nitrite: x   Leuk Esterase: x / RBC: x / WBC x   Sq Epi: x / Non Sq Epi: x / Bacteria: x        Lactate, Blood: 1.4 mmol/L (08-18-23 @ 05:05)  Lactate, Blood: 10.8 mmol/L (08-17-23 @ 12:35)    CARDIAC MARKERS ( 18 Aug 2023 05:05 )  x     / <0.01 ng/mL / x     / x     / x      CARDIAC MARKERS ( 17 Aug 2023 20:18 )  x     / 0.06 ng/mL / x     / x     / x      CARDIAC MARKERS ( 17 Aug 2023 12:35 )  x     / <0.01 ng/mL / x     / x     / x                                                            Culture - Blood (collected 17 Aug 2023 20:18)  Source: .Blood None  Preliminary Report (19 Aug 2023 02:03):    No growth at 24 hours                                                                                           MEDICATIONS  (STANDING):  albuterol/ipratropium for Nebulization 3 milliLiter(s) Nebulizer every 4 hours  albuterol/ipratropium for Nebulization 3 milliLiter(s) Nebulizer every 20 minutes  azithromycin  IVPB 500 milliGRAM(s) IV Intermittent every 24 hours  carvedilol 6.25 milliGRAM(s) Oral every 12 hours  cefTRIAXone   IVPB 1000 milliGRAM(s) IV Intermittent every 24 hours  chlorhexidine 2% Cloths 1 Application(s) Topical <User Schedule>  cloNIDine 0.1 milliGRAM(s) Oral every 12 hours  enoxaparin Injectable 40 milliGRAM(s) SubCutaneous every 24 hours  levothyroxine 100 MICROGram(s) Oral daily  methylPREDNISolone sodium succinate Injectable 40 milliGRAM(s) IV Push every 12 hours  pantoprazole    Tablet 40 milliGRAM(s) Oral before breakfast  torsemide 20 milliGRAM(s) Oral daily  valsartan 320 milliGRAM(s) Oral at bedtime    MEDICATIONS  (PRN):  diphenhydrAMINE 25 milliGRAM(s) Oral every 12 hours PRN Allergy symptoms          Xrays:  TLC:  OG:  ET tube:                                                                                    mild congestion    ECHO:  CAM ICU:        
INTERVAL HPI/OVERNIGHT EVENTS:    SUBJECTIVE: Patient seen and examined at bedside.     no cp, sob, abd pain, fever  no sob, orthopnea, pnd, cough    OBJECTIVE:    VITAL SIGNS:  Vital Signs Last 24 Hrs  T(C): 35.6 (20 Aug 2023 12:00), Max: 36.4 (19 Aug 2023 21:30)  T(F): 96 (20 Aug 2023 12:00), Max: 97.6 (19 Aug 2023 21:30)  HR: 62 (20 Aug 2023 12:00) (54 - 104)  BP: 131/61 (20 Aug 2023 12:00) (113/60 - 143/84)  BP(mean): 88 (20 Aug 2023 12:00) (78 - 102)  RR: 18 (20 Aug 2023 12:00) (18 - 24)  SpO2: 99% (20 Aug 2023 12:00) (95% - 99%)    Parameters below as of 20 Aug 2023 12:00  Patient On (Oxygen Delivery Method): room air          PHYSICAL EXAM:    General: NAD  HEENT: NC/AT; PERRL, clear conjunctiva  Neck: supple  Respiratory: bl crackles  Cardiovascular: +S1/S2; RRR  Abdomen: soft, NT/ND; +BS x4  Extremities: WWP, 2+ peripheral pulses b/l; no LE edema  Skin: normal color and turgor; no rash  Neurological:    MEDICATIONS:  MEDICATIONS  (STANDING):  albuterol/ipratropium for Nebulization 3 milliLiter(s) Nebulizer every 4 hours  albuterol/ipratropium for Nebulization 3 milliLiter(s) Nebulizer every 20 minutes  azithromycin  IVPB 500 milliGRAM(s) IV Intermittent every 24 hours  carvedilol 6.25 milliGRAM(s) Oral every 12 hours  cefTRIAXone   IVPB 1000 milliGRAM(s) IV Intermittent every 24 hours  chlorhexidine 2% Cloths 1 Application(s) Topical <User Schedule>  cloNIDine 0.1 milliGRAM(s) Oral every 12 hours  enoxaparin Injectable 40 milliGRAM(s) SubCutaneous every 24 hours  levothyroxine 100 MICROGram(s) Oral daily  pantoprazole    Tablet 40 milliGRAM(s) Oral before breakfast  torsemide 20 milliGRAM(s) Oral daily  valsartan 320 milliGRAM(s) Oral at bedtime    MEDICATIONS  (PRN):  acetaminophen     Tablet .. 650 milliGRAM(s) Oral every 6 hours PRN Temp greater or equal to 38C (100.4F), Mild Pain (1 - 3)  diphenhydrAMINE 25 milliGRAM(s) Oral every 12 hours PRN Allergy symptoms      ALLERGIES:  Allergies    dairy products (Anaphylaxis)  sulfa drugs (Rash; Urticaria)  shellfish (Unknown)  sulfa drugs (Unknown)  Milk (Anaphylaxis)    Intolerances        LABS:                        9.9    15.71 )-----------( 255      ( 20 Aug 2023 04:19 )             34.1     Hemoglobin: 9.9 g/dL (08-20 @ 04:19)  Hemoglobin: 11.1 g/dL (08-19 @ 05:23)  Hemoglobin: 10.1 g/dL (08-18 @ 05:05)  Hemoglobin: 12.0 g/dL (08-17 @ 12:35)    CBC Full  -  ( 20 Aug 2023 04:19 )  WBC Count : 15.71 K/uL  RBC Count : 4.38 M/uL  Hemoglobin : 9.9 g/dL  Hematocrit : 34.1 %  Platelet Count - Automated : 255 K/uL  Mean Cell Volume : 77.9 fL  Mean Cell Hemoglobin : 22.6 pg  Mean Cell Hemoglobin Concentration : 29.0 g/dL  Auto Neutrophil # : x  Auto Lymphocyte # : x  Auto Monocyte # : x  Auto Eosinophil # : x  Auto Basophil # : x  Auto Neutrophil % : x  Auto Lymphocyte % : x  Auto Monocyte % : x  Auto Eosinophil % : x  Auto Basophil % : x    08-20    142  |  100  |  31<H>  ----------------------------<  112<H>  4.0   |  34<H>  |  1.0    Ca    9.3      20 Aug 2023 04:19  Mg     2.2     08-20    TPro  6.0  /  Alb  3.6  /  TBili  0.3  /  DBili  x   /  AST  12  /  ALT  12  /  AlkPhos  69  08-20    Creatinine Trend: 1.0<--, 1.0<--, 0.8<--, 1.1<--  LIVER FUNCTIONS - ( 20 Aug 2023 04:19 )  Alb: 3.6 g/dL / Pro: 6.0 g/dL / ALK PHOS: 69 U/L / ALT: 12 U/L / AST: 12 U/L / GGT: x               hs Troponin:            Urinalysis Basic - ( 20 Aug 2023 04:19 )    Color: x / Appearance: x / SG: x / pH: x  Gluc: 112 mg/dL / Ketone: x  / Bili: x / Urobili: x   Blood: x / Protein: x / Nitrite: x   Leuk Esterase: x / RBC: x / WBC x   Sq Epi: x / Non Sq Epi: x / Bacteria: x      CSF:                      EKG:   MICROBIOLOGY:    Culture - Blood (collected 17 Aug 2023 20:18)  Source: .Blood None  Preliminary Report (20 Aug 2023 02:02):    No growth at 48 Hours      IMAGING:      Labs, imaging, EKG personally reviewed    RADIOLOGY & ADDITIONAL TESTS: Reviewed.

## 2023-08-20 NOTE — PROGRESS NOTE ADULT - ASSESSMENT
53-year-old female with PMH of HTN, Asthma/COPD (never intubated), GERD, multiple allergies (with history of anaphylactic shock as per the patient), and hypothyroidism presenting for shortness of breath.    She was admitted to CCU for acute hypoxic resp failure with pulm edema and ?pna, anaphylaxis and hypertensive emergency.     #Acute Hypoxemic respiratory failure -resolved  #Asthma exacerbation/Anaphylaxis -resolved  #Pulmonary edema   #?PNA   -C/w prednisone 40 mg for 3 days then 20 mg for 3 days nebs q 4 hr prn    -C/w Rocephin/Azithro to complete 5 day course; f/u cx, RVP panel  -Bipap prn  -C/w Duonebs prn  -IV lasix x1 today per icu team    #Hypertensive emergency -resolved  -BP well controlled now  -C/w home meds: Carvedilol, Valsartan-HCTZ, Torsemide, Clonidine    #HO GERD- c/w PPI      PT/Rehab eval    DVT ppx    Dispo: transfer to tele.   
IMPRESSION:    Acute Hypoxemic respiratory failure  Hypertensive emergency  Asthma exacerbation/Anaphylaxis  HO GERD  Pulmonary edema   Questionable pneumonia    PLAN:      CNS: avoid sedation. MS at baseline.     HEENT: Oral care    PULMONARY:  HOB @ 45 degrees.  Aspiration precautions, BiPAP as needed,  switch prednisone 40 mg for 3 days then 20 mg for 3 days nebs q 4 hr prn   .     CARDIOVASCULAR: Repeat CE negative. BNP elevated. Lasix 40 mg IV once; resume her oral diuretic. Echocardiogram. BP controlled now. Repeat lactate normalized. carvedilol for the PVC/bigemini.   clonidine, torsemide , valsartam    GI: GI prophylaxis. Oral diet while off BIPAP.     RENAL:  Follow up lytes.  Correct as needed. Resume home antihypertensive medications.     INFECTIOUS DISEASE: Follow up cultures, procalcitonin, Rocephin , azithromycin for now. RVP panel. Nasal MRSA.     HEMATOLOGICAL:  DVT prophylaxis. Check duplex lower extremities. D-dimer noted and elevated.    ENDOCRINE:  Follow up FS.  Insulin protocol if needed.    MUSCULOSKELETAL: Out of bed.     Transfer to  tele   
IMPRESSION:    Acute Hypoxemic respiratory failure improved   Acute on chronic hypercapnic respiratory failure improved   Hypertensive emergency  Asthma exacerbation  JANAK / O[facility]   HO GERD  Pulmonary edema       PLAN:    CNS: avoid sedation. MS at baseline.     HEENT: Oral care    PULMONARY:  HOB @ 45 degrees.  Aspiration precautions.  Short Prednisone taper.  Symbicort upon DC.  Nebs PRN.  OP pulm follow up for PFT nad NPSG     CARDIOVASCULAR:  BP control.  Maximize cardiac therapy and volume status.      GI: GI prophylaxis. Feeding     RENAL:  Follow up lytes.  Correct as needed.     INFECTIOUS DISEASE: Follow up cultures.  RVP panel negative.     HEMATOLOGICAL:  DVT prophylaxis. Duplex lower extremities negative    ENDOCRINE:  Follow up FS.     MUSCULOSKELETAL: Out of bed.     DGTF / DC planing   
Post anaphylactic shock  Urgent HTN crisis, improved and is WNL now on multiple medication  Moderate diastolic HF, euvolemic now, isolated PVCs now, at time of BP surge had bigeminies (likely due to sub endocardial ischemia and strain)    I doubt she requires 2 antibiotics, please pay attention to it if there is a clear indication  current medications: Carvedilol, Valsartan-HCTZ, Torsemide, Clonidine (as out patient she was on once daily 0.1 mg),  In out patient f/u will try to decrease the Clonidine   She needs sleep apnea management as well  Stable to be d/c to telemetry  
53F PMHx HTN, asthma/ COPD, hypothyroidism here with acute hypoxic and hypercapnic resp failure. Metabolic acidosis, lactic acidosis; now resolved.

## 2023-08-20 NOTE — PROGRESS NOTE ADULT - NSPROGADDITIONALINFOA_GEN_ALL_CORE
#Progress Note Handoff:  Pending (specify):  Consults_________, Tests________, Test Results_______, Other____hypoxia_____  Family discussion: d/w pt at bedside  Disposition: Home___/SNF___/Other________/Unknown at this time___x_____

## 2023-08-20 NOTE — DISCHARGE NOTE PROVIDER - PROVIDER TOKENS
PROVIDER:[TOKEN:[[id number]],FOLLOWUP:[1 week]],PROVIDER:[TOKEN:[[id number]],FOLLOWUP:[1 week]]

## 2023-08-20 NOTE — DISCHARGE NOTE PROVIDER - NSDCCPCAREPLAN_GEN_ALL_CORE_FT
PRINCIPAL DISCHARGE DIAGNOSIS  Diagnosis: Acute hypercapnic respiratory failure  Assessment and Plan of Treatment: Asthma is a condition in which the airways tighten and narrow, making it difficult to breath. Asthma episodes, also called asthma attacks, range from minor to life-threatening. Symptoms include wheezing, coughing, chest tightness, or shortness of breath. The diagnosis of asthma is made by a review of your medical history and a physical exam, but may involve additional testing. Asthma cannot be cured, but medicines and lifestyle changes can help control it. Avoid triggers of asthma which may include animal dander, pollen, mold, smoke, air pollutants, etc.   SEEK IMMEDIATE MEDICAL CARE IF YOU HAVE ANY OF THE FOLLOWING SYMPTOMS: worsening of symptoms, shortness of breath at rest, chest pain, bluish discoloration to lips or fingertips, lightheadedness/dizziness, or fever.  You will be discharged with steroids to take outpatient. Continue to take them as prescribed.      SECONDARY DISCHARGE DIAGNOSES  Diagnosis: Sepsis, unspecified organism  Assessment and Plan of Treatment:     Diagnosis: Acute pulmonary edema  Assessment and Plan of Treatment:     Diagnosis: Hypothyroidism  Assessment and Plan of Treatment:     Diagnosis: HTN (hypertension)  Assessment and Plan of Treatment:

## 2023-08-20 NOTE — PROGRESS NOTE ADULT - PROBLEM SELECTOR PLAN 1
presumed copd/ asthma exacerbation; +/- pulm edema  hypercapnia resolved, s/p continuous bipap  cxr bl opacities, improving  rvp neg  hold abx  prednisone 40  torsemide 20  tte with preserved ef, grade iii diastolic dysfunction  duoneb q4 presumed copd/ asthma exacerbation; +/- pulm edema  hypercapnia resolved, s/p continuous bipap  cxr bl opacities, improving  rvp neg  hold abx  prednisone 40  torsemide 20  tte with preserved ef, grade iii diastolic dysfunction  duoneb q4  pt requesting for d/c today; will need outpt pmd, cards, pulm f/u one week

## 2023-08-20 NOTE — DISCHARGE NOTE PROVIDER - CARE PROVIDER_API CALL
Felicita Thurman  Cardiology  [address] [place] [address]  Phone: [phone]  Fax: [phone]  Follow Up Time: 1 week    Mrashall Wilhelm  Pulmonary Disease  [address] [place] [address], [address] [address]  Phone: [phone]  Fax: [phone]  Follow Up Time: 1 week

## 2023-08-21 ENCOUNTER — TRANSCRIPTION ENCOUNTER (OUTPATIENT)
Age: 53
End: 2023-08-21

## 2023-08-21 LAB
% ALBUMIN: 55.6 % — SIGNIFICANT CHANGE UP
% ALPHA 1: 5.7 % — SIGNIFICANT CHANGE UP
% ALPHA 2: 12.4 % — SIGNIFICANT CHANGE UP
% BETA: 14.5 % — SIGNIFICANT CHANGE UP
% GAMMA: 11.8 % — SIGNIFICANT CHANGE UP
ALBUMIN SERPL ELPH-MCNC: 3.3 G/DL — LOW (ref 3.6–5.5)
ALBUMIN/GLOB SERPL ELPH: 1.2 RATIO — SIGNIFICANT CHANGE UP
ALPHA1 GLOB SERPL ELPH-MCNC: 0.3 G/DL — SIGNIFICANT CHANGE UP (ref 0.1–0.4)
ALPHA2 GLOB SERPL ELPH-MCNC: 0.7 G/DL — SIGNIFICANT CHANGE UP (ref 0.5–1)
B-GLOBULIN SERPL ELPH-MCNC: 0.9 G/DL — SIGNIFICANT CHANGE UP (ref 0.5–1)
DEPRECATED KAPPA LC FREE/LAMBDA SER: 5.74 RATIO — SIGNIFICANT CHANGE UP (ref 0.7–6.02)
GAMMA GLOBULIN: 0.7 G/DL — SIGNIFICANT CHANGE UP (ref 0.6–1.6)
KAPPA LC SER QL IFE: 1.58 MG/DL — SIGNIFICANT CHANGE UP (ref 0.33–1.94)
KAPPA LC UR-MCNC: 95.92 MG/L — HIGH
KAPPA/LAMBDA FREE LIGHT CHAIN RATIO, SERUM: 2.05 RATIO — HIGH (ref 0.26–1.65)
LAMBDA LC SER QL IFE: 0.77 MG/DL — SIGNIFICANT CHANGE UP (ref 0.57–2.63)
LAMBDA LC UR-MCNC: 16.71 MG/L — HIGH
PROT PATTERN SERPL ELPH-IMP: SIGNIFICANT CHANGE UP
TRYPTASE SERPL-MCNC: 4.1 UG/L — SIGNIFICANT CHANGE UP

## 2023-08-22 LAB
% GAMMA, URINE: 7.9 % — SIGNIFICANT CHANGE UP
ALBUMIN 24H MFR UR ELPH: 26.5 % — SIGNIFICANT CHANGE UP
ALPHA1 GLOB 24H MFR UR ELPH: 29.4 % — SIGNIFICANT CHANGE UP
ALPHA2 GLOB 24H MFR UR ELPH: 19.2 % — SIGNIFICANT CHANGE UP
B-GLOBULIN 24H MFR UR ELPH: 17 % — SIGNIFICANT CHANGE UP
INTERPRETATION 24H UR IFE-IMP: SIGNIFICANT CHANGE UP
M PROTEIN 24H UR ELPH-MRATE: SIGNIFICANT CHANGE UP
PROT PATTERN 24H UR ELPH-IMP: SIGNIFICANT CHANGE UP

## 2023-08-23 DIAGNOSIS — E03.9 HYPOTHYROIDISM, UNSPECIFIED: ICD-10-CM

## 2023-08-23 DIAGNOSIS — Z79.2 LONG TERM (CURRENT) USE OF ANTIBIOTICS: ICD-10-CM

## 2023-08-23 DIAGNOSIS — J81.0 ACUTE PULMONARY EDEMA: ICD-10-CM

## 2023-08-23 DIAGNOSIS — Z79.899 OTHER LONG TERM (CURRENT) DRUG THERAPY: ICD-10-CM

## 2023-08-23 DIAGNOSIS — E66.2 MORBID (SEVERE) OBESITY WITH ALVEOLAR HYPOVENTILATION: ICD-10-CM

## 2023-08-23 DIAGNOSIS — E87.20 ACIDOSIS, UNSPECIFIED: ICD-10-CM

## 2023-08-23 DIAGNOSIS — J96.01 ACUTE RESPIRATORY FAILURE WITH HYPOXIA: ICD-10-CM

## 2023-08-23 DIAGNOSIS — I11.0 HYPERTENSIVE HEART DISEASE WITH HEART FAILURE: ICD-10-CM

## 2023-08-23 DIAGNOSIS — Z91.141 PATIENT'S OTHER NONCOMPLIANCE WITH MEDICATION REGIMEN DUE TO FINANCIAL HARDSHIP: ICD-10-CM

## 2023-08-23 DIAGNOSIS — T38.0X6A UNDERDOSING OF GLUCOCORTICOIDS AND SYNTHETIC ANALOGUES, INITIAL ENCOUNTER: ICD-10-CM

## 2023-08-23 DIAGNOSIS — Z88.2 ALLERGY STATUS TO SULFONAMIDES: ICD-10-CM

## 2023-08-23 DIAGNOSIS — J44.1 CHRONIC OBSTRUCTIVE PULMONARY DISEASE WITH (ACUTE) EXACERBATION: ICD-10-CM

## 2023-08-23 DIAGNOSIS — K03.81 CRACKED TOOTH: ICD-10-CM

## 2023-08-23 DIAGNOSIS — T78.2XXA ANAPHYLACTIC SHOCK, UNSPECIFIED, INITIAL ENCOUNTER: ICD-10-CM
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
## 2023-08-23 DIAGNOSIS — I16.1 HYPERTENSIVE EMERGENCY: ICD-10-CM

## 2023-08-23 DIAGNOSIS — K21.9 GASTRO-ESOPHAGEAL REFLUX DISEASE WITHOUT ESOPHAGITIS: ICD-10-CM

## 2023-08-23 DIAGNOSIS — I49.3 VENTRICULAR PREMATURE DEPOLARIZATION: ICD-10-CM

## 2023-08-23 DIAGNOSIS — D72.829 ELEVATED WHITE BLOOD CELL COUNT, UNSPECIFIED: ICD-10-CM

## 2023-08-23 DIAGNOSIS — Z87.892 PERSONAL HISTORY OF ANAPHYLAXIS: ICD-10-CM

## 2023-08-23 DIAGNOSIS — Z91.013 ALLERGY TO SEAFOOD: ICD-10-CM

## 2023-08-23 DIAGNOSIS — I50.32 CHRONIC DIASTOLIC (CONGESTIVE) HEART FAILURE: ICD-10-CM

## 2023-08-23 DIAGNOSIS — J96.02 ACUTE RESPIRATORY FAILURE WITH HYPERCAPNIA: ICD-10-CM

## 2023-08-23 DIAGNOSIS — Z91.011 ALLERGY TO MILK PRODUCTS: ICD-10-CM

## 2023-08-23 DIAGNOSIS — Z20.822 CONTACT WITH AND (SUSPECTED) EXPOSURE TO COVID-19: ICD-10-CM

## 2023-08-23 DIAGNOSIS — A41.9 SEPSIS, UNSPECIFIED ORGANISM: ICD-10-CM

## 2023-08-23 DIAGNOSIS — J45.901 UNSPECIFIED ASTHMA WITH (ACUTE) EXACERBATION: ICD-10-CM

## 2023-08-23 LAB
CULTURE RESULTS: SIGNIFICANT CHANGE UP
SPECIMEN SOURCE: SIGNIFICANT CHANGE UP

## 2023-09-06 NOTE — DISCHARGE NOTE PROVIDER - HOSPITAL COURSE
Sent naproxen to the pharmacy, please take as needed for pain and swelling  Ace bandage and ice for the swelling    Follow-up with work well      Follow-up with Ortho if no improvement after the next couple weeks  
HPI :  Case of 53-year-old female with PMH of HTN, Asthma/COPD (never intubated), GERD, multiple allergies (with history of anaphylactic shock as per the patient), and hypothyroidism presenting for shortness of breath.    History goes back to one day ago when patient ate multiple things with her nephews around 6pm, she had some mild shortness of breath, took her ventolin and went to sleep. Today at 11am, she woke up with shortness of breath and dizziness. She has multiple history of allergies so she thought she was having an allergic reaction, took a shot of epi and called EMS.    Of note, patient says she saw a pulmonologist at the [facility] who diagnosed her with asthma/copd. I called the pharmacy, patient was supposed to be on Breo but never picked up because insurance didn't cover. She is also on torsemide for LLE but no clear cardiac history. She used to follow with an allergist for her allergy history      ED Course :  In the ED, vitals showed /85 and HR of 130, afebrile  Labs showed WBC of 24k, lactate 10.8, ProBNP 1200  VBG showed pH 7, CO2 74, O2 55, bicarb 20, and lactic 10  Patient started on nitro drip and bipap  Repeat VBG showed pH 7.33, pCO2 56, O2 40, and lactic of 2.  CXR showed bilateral infiltrates    Patient admitted for HTN emergency with possible asthma exacerbation        CCU Course :   Patient came to CCU on 8/17.  Pt was on  Aspiration precautions, BiPAP as needed, Solumedrol 40mg BID. nebs q 4 hr.   Repeated ABG was with improvement in hypercapnia. Repeat CXR for b/l infiltrates was noted.   Repeated CE was negative. BNP elevated. Lasix 40 mg IV once was given ; resumed her oral diuretic.   Echocardiogram  showed diastolic dysfunction of grade 3 with EF of 55. . BP was  controlled on antihypertensives. Repeat lactate was normal. Metoprolol for the   PVC/bigemini was given.  ON Rocephin 1g IVPB and azithromycin 500mg IVBP.  Elevated D-dimers noted  and ordered Duplex LE.  RVP came back negative   Continue clonidine 0.1mg q12  IV Solu-Medrol D/C 8/19 and switched to PO Prednisone 40mg one a day for 3 days then 20mg PO once a day for 3 days, has received first dose 8/19           Problem/Plan - 1:  ·  Problem: Acute respiratory failure with hypoxia and hypercapnia.   ·  Plan: presumed copd/ asthma exacerbation; +/- pulm edema  hypercapnia resolved, s/p continuous bipap  cxr bl opacities, improving  rvp neg  hold abx  prednisone 40  torsemide 20  tte with preserved ef, grade iii diastolic dysfunction  duoneb q4.    Problem/Plan - 2:  ·  Problem: HTN (hypertension).   ·  Plan: coreg 6.25 bid  valsartan 320  clonidine 0.1 bid.    Problem/Plan - 3:  ·  Problem: Hypothyroidism.   ·  Plan: synthroid 100.

## 2023-10-20 PROBLEM — J45.909 UNSPECIFIED ASTHMA, UNCOMPLICATED: Chronic | Status: ACTIVE | Noted: 2023-08-17

## 2023-11-24 ENCOUNTER — OUTPATIENT (OUTPATIENT)
Dept: OUTPATIENT SERVICES | Facility: HOSPITAL | Age: 53
LOS: 1 days | End: 2023-11-24
Payer: MEDICAID

## 2023-11-24 ENCOUNTER — RESULT REVIEW (OUTPATIENT)
Age: 53
End: 2023-11-24

## 2023-11-24 ENCOUNTER — APPOINTMENT (OUTPATIENT)
Dept: PULMONOLOGY | Facility: CLINIC | Age: 53
End: 2023-11-24
Payer: MEDICAID

## 2023-11-24 ENCOUNTER — TRANSCRIPTION ENCOUNTER (OUTPATIENT)
Age: 53
End: 2023-11-24

## 2023-11-24 VITALS
HEART RATE: 68 BPM | DIASTOLIC BLOOD PRESSURE: 84 MMHG | BODY MASS INDEX: 45.31 KG/M2 | WEIGHT: 240 LBS | OXYGEN SATURATION: 98 % | SYSTOLIC BLOOD PRESSURE: 140 MMHG | HEIGHT: 61 IN

## 2023-11-24 DIAGNOSIS — R93.89 ABNORMAL FINDINGS ON DIAGNOSTIC IMAGING OF OTHER SPECIFIED BODY STRUCTURES: ICD-10-CM

## 2023-11-24 DIAGNOSIS — G47.33 OBSTRUCTIVE SLEEP APNEA (ADULT) (PEDIATRIC): ICD-10-CM

## 2023-11-24 DIAGNOSIS — J45.40 MODERATE PERSISTENT ASTHMA, UNCOMPLICATED: ICD-10-CM

## 2023-11-24 PROCEDURE — 99204 OFFICE O/P NEW MOD 45 MIN: CPT | Mod: 25

## 2023-11-24 PROCEDURE — 94010 BREATHING CAPACITY TEST: CPT

## 2023-11-24 PROCEDURE — 71046 X-RAY EXAM CHEST 2 VIEWS: CPT | Mod: 26

## 2023-11-24 PROCEDURE — 71046 X-RAY EXAM CHEST 2 VIEWS: CPT

## 2023-11-24 RX ORDER — BUDESONIDE AND FORMOTEROL FUMARATE DIHYDRATE 80; 4.5 UG/1; UG/1
80-4.5 AEROSOL RESPIRATORY (INHALATION) TWICE DAILY
Qty: 1 | Refills: 4 | Status: ACTIVE | COMMUNITY
Start: 2023-11-24 | End: 1900-01-01

## 2023-11-25 DIAGNOSIS — R93.89 ABNORMAL FINDINGS ON DIAGNOSTIC IMAGING OF OTHER SPECIFIED BODY STRUCTURES: ICD-10-CM

## 2023-12-05 ENCOUNTER — NON-APPOINTMENT (OUTPATIENT)
Age: 53
End: 2023-12-05

## 2024-01-25 ENCOUNTER — APPOINTMENT (OUTPATIENT)
Dept: PULMONOLOGY | Facility: CLINIC | Age: 54
End: 2024-01-25

## 2024-03-08 ENCOUNTER — NON-APPOINTMENT (OUTPATIENT)
Age: 54
End: 2024-03-08

## 2024-04-05 ENCOUNTER — APPOINTMENT (OUTPATIENT)
Dept: NEUROLOGY | Facility: CLINIC | Age: 54
End: 2024-04-05
Payer: MEDICAID

## 2024-04-05 VITALS — HEIGHT: 61 IN | RESPIRATION RATE: 14 BRPM | WEIGHT: 240 LBS | BODY MASS INDEX: 45.31 KG/M2

## 2024-04-05 DIAGNOSIS — M54.16 RADICULOPATHY, LUMBAR REGION: ICD-10-CM

## 2024-04-05 DIAGNOSIS — M54.12 RADICULOPATHY, CERVICAL REGION: ICD-10-CM

## 2024-04-05 DIAGNOSIS — R53.1 WEAKNESS: ICD-10-CM

## 2024-04-05 PROCEDURE — 99215 OFFICE O/P EST HI 40 MIN: CPT

## 2024-04-05 RX ORDER — GABAPENTIN 100 MG/1
100 CAPSULE ORAL
Qty: 90 | Refills: 2 | Status: ACTIVE | COMMUNITY
Start: 2024-04-05 | End: 1900-01-01

## 2024-04-05 RX ORDER — PREDNISONE 10 MG/1
10 TABLET ORAL
Qty: 15 | Refills: 0 | Status: ACTIVE | COMMUNITY
Start: 2024-04-05 | End: 1900-01-01

## 2024-04-05 NOTE — DISCUSSION/SUMMARY
[FreeTextEntry1] : Pt is a 55 yo F with hypothyroidism, peripheral edema, IBS, GERD, HTN, asthma, JANAK, who presents with right sided numbness/weakness/pain and imbalance.  # Right lower>upper extremity paresis, hypoesthesia and pain: decreased strength in RUE and RLE. likely 2/2 radiculopathy vs neuropathy. Previously completed labs with elevated ESR/CRP (still has not f/u with Rheumatology) and low vitamin D level.  - MRI cervical and lumbar spine pending - EMG/NCS of RUE/RLE - Steroids taper x 5 days - Start GBP 100mg TID - PT/OT pending MRI results  RTC in 4 mo

## 2024-04-05 NOTE — HISTORY OF PRESENT ILLNESS
[FreeTextEntry1] : Interval History: Pt presents today for f/u. Endorses new/worsening numbness/tingling/pain in her right arm and leg, leg more than arm. Has difficulty ambulating, getting into/out of the shower. Numbness is worse when on her feet, has a hard time sleeping due to the discomfort. Pain radiating down her right arm and down the back of her right leg.   HPI:  Pt is a 54 yo F with hypothyroidism, peripheral edema, IBS, GERD, HTN, who presents with s/o diffuse pain and mobility issues. states that she has had multifocal joint pain for several years, particularly affecting the right shoulder, hand and hip. Due to the pain and stiffness, she has a hard time remaining in any single position for a long time, standing up, ambulating. Also endorses feeling pain and tingling with even light touch, especially in her legs. She was recently started on a water pill for swelling in her legs. Endorses numbness in her 2nd and 3rd digits of her right hand. Endorses stiffness and pain in her neck and low back. Denies b/b incontinence. Had a fall a few months ago, right legs gave out as she was walking up the stairs. Does not use any ambulatory assist devices. Denies family h/o autoimmune diseases.

## 2024-04-05 NOTE — PHYSICAL EXAM
[General Appearance - Alert] : alert [General Appearance - In No Acute Distress] : in no acute distress [General Appearance - Well Nourished] : well nourished [General Appearance - Well Developed] : well developed [Oriented To Time, Place, And Person] : oriented to person, place, and time [Affect] : the affect was normal [Person] : oriented to person [Place] : oriented to place [Time] : oriented to time [Naming Objects] : no difficulty naming common objects [Fluency] : fluency intact [Comprehension] : comprehension intact [Cranial Nerves Optic (II)] : visual acuity intact bilaterally,  visual fields full to confrontation, pupils equal round and reactive to light [Cranial Nerves Oculomotor (III)] : extraocular motion intact [Cranial Nerves Trigeminal (V)] : facial sensation intact symmetrically [Cranial Nerves Facial (VII)] : face symmetrical [Cranial Nerves Vestibulocochlear (VIII)] : hearing was intact bilaterally [Cranial Nerves Glossopharyngeal (IX)] : tongue and palate midline [Cranial Nerves Hypoglossal (XII)] : there was no tongue deviation with protrusion [Motor Tone] : muscle tone was normal in all four extremities [Coordination - Dysmetria Impaired Finger-to-Nose Bilateral] : not present [2+] : Patella left 2+ [FreeTextEntry6] : Pain limited movement of RUE/RLE proximally. RUE 4/5, RLE 4/5 LUE/LLE 5/5 [FreeTextEntry7] : decreased light touch and pin prick in right 2-3rd digits of hand and RLE [FreeTextEntry9] : antalgic gait, wide stance

## 2024-06-10 ENCOUNTER — NON-APPOINTMENT (OUTPATIENT)
Age: 54
End: 2024-06-10

## 2024-07-14 ENCOUNTER — NON-APPOINTMENT (OUTPATIENT)
Age: 54
End: 2024-07-14

## 2024-07-15 ENCOUNTER — NON-APPOINTMENT (OUTPATIENT)
Age: 54
End: 2024-07-15

## 2024-07-20 ENCOUNTER — NON-APPOINTMENT (OUTPATIENT)
Age: 54
End: 2024-07-20

## 2024-09-06 ENCOUNTER — NON-APPOINTMENT (OUTPATIENT)
Age: 54
End: 2024-09-06
